# Patient Record
(demographics unavailable — no encounter records)

---

## 2024-12-04 NOTE — REVIEW OF SYSTEMS
[Swelling] : swelling [All other systems negative] : All other systems negative [de-identified] : pedal [Negative] : Heme/Lymph

## 2024-12-04 NOTE — REVIEW OF SYSTEMS
[Swelling] : swelling [All other systems negative] : All other systems negative [de-identified] : pedal [Negative] : Heme/Lymph

## 2024-12-04 NOTE — REVIEW OF SYSTEMS
[Swelling] : swelling [All other systems negative] : All other systems negative [de-identified] : pedal [Negative] : Heme/Lymph

## 2024-12-05 NOTE — HISTORY OF PRESENT ILLNESS
[FreeTextEntry1] : NILESH VANG is a 74y Male Cantonese speaking with PMHx of prostate CA  s/p radiation, elevated BP w/o hx HTN, HLD, DM, CKD stage 2, anemia presents to [facility] ED for dizziness. LKW 11 night before bed. Woke up  AM with room spinning dizziness and feeling weak in his legs and off balance while walking. Otherwise denies focal deficits. Reports feels better now however dizziness still persists. CTH neg. CTA with deposition of calcified plaque with mild to moderate stenosis in association with the cavernous and supraclinoid right internal carotid artery and mild stenosis involving the cavernous and supraclinoid left internal carotid artery. Has also been c/o non-traumatic right hip pain. CT pelvis neg for fracture. Was given DAPT and transferred to [facility] stroke service for further w/u. Endocrine consulted in hospital for diabetes management.  Suspect patient is insulinopenic as basal/bolus ratio disproportionately favors bolus to basal. C-peptide 0.1 with FS 89 - indeterminate due to FS <100mg/dL. He is also thin. Limits carbs in diet at home, due to fear of PP hyperglycemia. Family reports that lantus insulin is leaking at home. On assessment, noted that he was not removing second needle cap, so not injecting insulin.  Pre hospital diabetes history: - Age at diagnosis: 55;  - Symptoms at time of diagnosis: Pt recalls feeling thirsty at diagnosis.  - Current Therapy: Farxiga 10 mg, Lantus 40 units, Metformin 850 mg, Januvia 100 mg, Actos 45 mg  - History of other regimens: Pt recalls two medications being stopped 3 months ago but cannot recall which ones, and was also started on Lantus - History of hypoglycemia: none - History of DKA/HHS: none - Complications: none - Home FS-200       > Fastin-200 mg/dL.       > Before meals: 180-200 mg/dL.      - Diet:         > Breakfast: oatmeal with egg        > Lunch: Chinese vegetables, soup, chicken       > Dinner: soup, vegetables sweet potatoes       > Snacks: almonds, cashews, peanuts       > Drinks: water, herb tea, black coffee  - Physical activity: Park activities 2x daily  - Outpatient follow-up: Dr. Dominic Mccormack, PCP   - Discharge recommendation:  - Stop home Metformin, Actos, Januvia, and Farxiga  - Lantus 22 units in the AM. Lispro 9 units before breakfast and 7 units before lunch and dinner + 2-3 units of lispro to his premeal doses for fingersticks over 250 - Cannot do CGM through [employer] due to medicare part B, but daughter purchased brinda 2+ with coupon and was placed prior to discharge. - Free style brinda 3 sent to [facility] ([phone]) as per daughter's wishes so they can process through Medicare Part B.   2024: Televideo with daughter and patient to for HFU insulin adjustment. Pt sitting in wheelchair, alert, no distress. Feeling well. Complaining of new foot swelling and concerned it's related to insulin. Denies pain, numbness or tingling. Advised leg elevation and to discuss with PCP. Likely unrelated to insulin or glucose control. Daughter reports that he is not eating consistent carbs. Eats few bites of carbs at meals, then glucose drops a few hours later and he overeats in response, and glucose goes up to 200-300s. Reports overnight glucose around 2-3am in 70s, for which he also eats. Fasting glucose reportedly 130-140. Is taking lantus 22 units in the AM, and Lispro 9 units before breakfast and 7 units before lunch and dinner, as recommended. Using freestyle brinda. Plan: Decrease Lantus to 16 units (from 22 units) in the AM. Increase lispro to 12 units for each meal and eat 45-60gm carb at each meal.  2024: Televideo with daughter and patient to for HFU insulin adjustment. She made insulin adjustment as recommended above, but was he was experiencing episodes of PP hypoglycemia (unclear if his meal carb was appropriate) and some hyperglycemia overnight. She visited PCP a few days ago and they adjusted to Lantus 18 and lispro 8 if BG <200 and 10 if >200, which she thinks is working better. No further hypoglycemia. Last night  (that reflects lantus 18) This morning, . Took Lantus 16 (pt forgot dose was increased) and lispro 8. Ate oatmeal and 1 slice of bread. 1hr . Daughter gave an additional 2 units and thought the hyperglycemia from only getting 16 of lantus. He is persistently hungry. Snacks on fruit, broth, nuts, and almond milk. Per daughter he is eating more consistent carbs at meals.

## 2024-12-05 NOTE — ASSESSMENT
[FreeTextEntry1] : Daryl Parra is a 74-year-old Male Cantonese speaking with PMHx of prostate CA 2023 s/p radiation, elevated BP w/o hx HTN, HLD, DM, CKD stage 2, anemia presented to [facility] ED for dizziness. CTH neg. CTA with deposition of calcified plaque with mild to moderate stenosis in association with the cavernous and supraclinoid right internal carotid artery and mild stenosis involving the cavernous and supraclinoid left internal carotid artery. Was given DAPT and transferred to [facility] stroke service for further w/u. MRI negative for acute infarction however does have posterior temporal horn prominence, cannot rule out underlying NPH. PT given meclizine in the ED for presumed peripheral vertigo with improvement of symptoms. He now presents to the office for post hospitalization follow-up.  DIzziness improved with Meclizine, will refer to ENT and also vestibular therapy for further treatment of peripheral vertigo. Advised to keep taking ASA for ICAD, LDL at goal. Will need neurosurgery evaluation for the abnormal MRI.   Plan: -Refer to neurosurgery for abnormal MRI  -Continue Meclizine PRN for vertigo -Referred to vestibular therapy -Referred to ENT for vertigo symptoms, negative stroke work-up -Advised to continue ASA and Lipitor for ICAD -Continue to f/u with Endocrinology for DM II management (A1c 11%) -RTC in 3 months

## 2024-12-05 NOTE — HISTORY OF PRESENT ILLNESS
[FreeTextEntry1] : Daryl Parra is a 74-year-old Male Cantonese speaking with PMHx of prostate CA 2023 s/p radiation, elevated BP w/o hx HTN, HLD, DM, CKD stage 2, anemia presented to [facility] ED for dizziness. CTH neg. CTA with deposition of calcified plaque with mild to moderate stenosis in association with the cavernous and supraclinoid right internal carotid artery and mild stenosis involving the cavernous and supraclinoid left internal carotid artery. Was given DAPT and transferred to [facility] stroke service for further w/u. MRI negative for acute infarction however does have posterior temporal horn prominence, cannot rule out underlying NPH. PT given meclizine in the ED for presumed peripheral vertigo with improvement of symptoms. He now presents to the office for post hospitalization follow-up.  Hospital course: During this hospital course, patient did not have a stroke.  Imaging: CT Head: No acute intracranial hemorrhage, mass effect, or midline shift MR Head Non Contrast: No evidence of acute infarction. Prominence of the temporal horns; findings may be due to volume loss though early hydrocephalus cannot be excluded CT Angio Head:  Deposition of calcified plaque with mild to moderate stenosis in association with the cavernous and supraclinoid right internal carotid artery and mild stenosis involving the cavernous and supraclinoid left internal carotid artery CT Angio Neck: negative [x]echo:  1. Normal left ventricular size and systolic function.  2. Normal right ventricular size and systolic function.  3. Normal atria.  4. Injection of agitated saline via a peripheral vein reveals no evidence of a right-to-left shunt.  5. Trace aortic regurgitation.  6. Aortic sclerosis without significant stenosis.  7. No evidence of pulmonary hypertension.  8. No pericardial effusion. [x] NICHOLAS:   1. Normal left ventricular size and systolic function.  2. Normal right ventricular size and systolic function.  3. No LA/RA/CANDACE/RAA thrombus seen.  4. No evidence of an intracardiac shunt.  5. 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  6. A filamentous echodensity is noted at the tip of the aortic valve, measuring 0.4 cm, suggestive of a fibrin strand (Lambl's excrescence).  7. No significant valvular disease.  8. No evidence of pulmonary hypertension.  9. No pericardial effusion.  Hospital labs: A1C: 11.1%, LDL: 48, TSH: 0.507  Current meds:  ASA, Lipitor 10mg, Ferrous sulfate 325mg, Flomax 0.4mg, Lantus 22units, Magnesium 400mg, Vit D 50,000, Meclizine 12.5mg, Novolog 7units BID before lunch and dinner, 9 units before breakfast, Prilosec 40mg daily, Vascepa 1Gm,   Since discharge, patient continues to have intermittent dizziness but not as bad as when he was in the hospital. He denies nausea, vomiting, headaches or room spinning sensation. Dizziness worsens when he is up and around, relieved when he lays down and sleep. He was compliant with the Meclizine x 1 week as prescribed. Since then, he had taken 4pills of PRN Meclizine this week for slight dizziness. Symptoms are relieved by Meclizine, He has not started vestibular therapy as recommended. Daughter states they never received a referral. He has never seen ENT in the past. They deny recent viral illness, but states last two days he has a cough. He has seen his endocrinologist recently and was educated on correct use of his antidiabetic medications. He mostly eats healthy.   Daughter reports that she noticed since May after his prostate cancer treatment with radiation, his gait has changed. He presented to the office today walking with a cane, he started using after discharge from the hospital because he was complaining he is dizzy. He has not had any falls. He also reports he has some sensation that the food doesn't come down easily when he eats but denies choking episodes.

## 2024-12-05 NOTE — ASSESSMENT
[Carbohydrate Consistent Diet] : carbohydrate consistent diet [Importance of Diet and Exercise] : importance of diet and exercise to improve glycemic control, achieve weight loss and improve cardiovascular health [Self Monitoring of Blood Glucose] : self monitoring of blood glucose [FreeTextEntry1] : The Lantus dose seems appropriate and advised her not to adjust the lantus further based on hyperglycemia during day. If he's hyperglycemic during day, it's likely an issue with lispro dose or mismatch of food and insulin. Advised moving fruit to mealtime when there is insulin to control the glucose, and he might need a few more units this way. Advised low carb snacks in between meals as there is no insulin in between meals. Reviewed blood glucose targets. Will send brinda sensor to retail, as she might pay OOP until endocrine visit can submit thru DME.  Can continue lantus 18 units in AM and lispro 8-10 units before meals.

## 2024-12-05 NOTE — ASSESSMENT
[FreeTextEntry1] : Daryl Parra is a 74-year-old Male Cantonese speaking with PMHx of prostate CA 2023 s/p radiation, elevated BP w/o hx HTN, HLD, DM, CKD stage 2, anemia presented to [facility] ED for dizziness. CTH neg. CTA with deposition of calcified plaque with mild to moderate stenosis in association with the cavernous and supraclinoid right internal carotid artery and mild stenosis involving the cavernous and supraclinoid left internal carotid artery. Was given DAPT and transferred to [facility] stroke service for further w/u. MRI negative for acute infarction however does have posterior temporal horn prominence, cannot rule out underlying NPH. PT given meclizine in the ED for presumed peripheral vertigo with improvement of symptoms. He now presents to the office for post hospitalization follow-up.  DIzziness improved with Meclizine, will refer to ENT and also vestibular therapy for further treatment of peripheral vertigo. Advised to keep taking ASA for ICAD, LDL at goal. Will need neurosurgery evaluation for the abnormal MRI.   Plan: -Refer to neurosurgery for abnormal MRI  -Continue Meclizine PRN for vertigo -Referred to vestibular therapy -Referred to ENT for vertigo symptoms, negative stroke work-up -Advised to continue ASA and Lipitor for ICAD -Continue to f/u with Endocrinology for DM II management (A1c 11%) -RTC in 3 months

## 2024-12-05 NOTE — PHYSICAL EXAM
[FreeTextEntry1] : Alert. Fully oriented. Speech and language are intact. Cranial nerves II-XII are intact. Motor exam reveals intact strength with individual muscle testing in bilateral upper and lower extremities. Tone is normal. Reflexes are normal. Sensation is intact to light touch in distal extremities. Finger-to-nose and heel-to-shin are intact. Rapid alternating movements are normal in the upper and lower extremities. Broad based gait without use of cane.

## 2024-12-05 NOTE — HISTORY OF PRESENT ILLNESS
[FreeTextEntry1] : Daryl Parra is a 74-year-old Male Cantonese speaking with PMHx of prostate CA 2023 s/p radiation, elevated BP w/o hx HTN, HLD, DM, CKD stage 2, anemia presented to [facility] ED for dizziness. CTH neg. CTA with deposition of calcified plaque with mild to moderate stenosis in association with the cavernous and supraclinoid right internal carotid artery and mild stenosis involving the cavernous and supraclinoid left internal carotid artery. Was given DAPT and transferred to [facility] stroke service for further w/u. MRI negative for acute infarction however does have posterior temporal horn prominence, cannot rule out underlying NPH. PT given meclizine in the ED for presumed peripheral vertigo with improvement of symptoms. He now presents to the office for post hospitalization follow-up.  Hospital course: During this hospital course, patient did not have a stroke.  Imaging: CT Head: No acute intracranial hemorrhage, mass effect, or midline shift MR Head Non Contrast: No evidence of acute infarction. Prominence of the temporal horns; findings may be due to volume loss though early hydrocephalus cannot be excluded CT Angio Head:  Deposition of calcified plaque with mild to moderate stenosis in association with the cavernous and supraclinoid right internal carotid artery and mild stenosis involving the cavernous and supraclinoid left internal carotid artery CT Angio Neck: negative [x]echo:  1. Normal left ventricular size and systolic function.  2. Normal right ventricular size and systolic function.  3. Normal atria.  4. Injection of agitated saline via a peripheral vein reveals no evidence of a right-to-left shunt.  5. Trace aortic regurgitation.  6. Aortic sclerosis without significant stenosis.  7. No evidence of pulmonary hypertension.  8. No pericardial effusion. [x] NICHOLAS:   1. Normal left ventricular size and systolic function.  2. Normal right ventricular size and systolic function.  3. No LA/RA/CANDACE/RAA thrombus seen.  4. No evidence of an intracardiac shunt.  5. 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  6. A filamentous echodensity is noted at the tip of the aortic valve, measuring 0.4 cm, suggestive of a fibrin strand (Lambl's excrescence).  7. No significant valvular disease.  8. No evidence of pulmonary hypertension.  9. No pericardial effusion.  Hospital labs: A1C: 11.1%, LDL: 48, TSH: 0.507  Current meds:  ASA, Lipitor 10mg, Ferrous sulfate 325mg, Flomax 0.4mg, Lantus 22units, Magnesium 400mg, Vit D 50,000, Meclizine 12.5mg, Novolog 7units BID before lunch and dinner, 9 units before breakfast, Prilosec 40mg daily, Vascepa 1Gm,   Since discharge, patient continues to have intermittent dizziness but not as bad as when he was in the hospital. He denies nausea, vomiting, headaches or room spinning sensation. Dizziness worsens when he is up and around, relieved when he lays down and sleep. He was compliant with the Meclizine x 1 week as prescribed. Since then, he had taken 4pills of PRN Meclizine this week for slight dizziness. Symptoms are relieved by Meclizine, He has not started vestibular therapy as recommended. Daughter states they never received a referral. He has never seen ENT in the past. They deny recent viral illness, but states last two days he has a cough. He has seen his endocrinologist recently and was educated on correct use of his antidiabetic medications. He mostly eats healthy.   Daughter reports that she noticed since May after his prostate cancer treatment with radiation, his gait has changed. He presented to the office today walking with a cane, he started using after discharge from the hospital because he was complaining he is dizzy. He has not had any falls. He also reports he has some sensation that the food doesn't come down easily when he eats but denies choking episodes.

## 2024-12-05 NOTE — HISTORY OF PRESENT ILLNESS
[FreeTextEntry1] : NILESH VANG is a 74y Male Cantonese speaking with PMHx of prostate CA  s/p radiation, elevated BP w/o hx HTN, HLD, DM, CKD stage 2, anemia presents to [facility] ED for dizziness. LKW 11 night before bed. Woke up  AM with room spinning dizziness and feeling weak in his legs and off balance while walking. Otherwise denies focal deficits. Reports feels better now however dizziness still persists. CTH neg. CTA with deposition of calcified plaque with mild to moderate stenosis in association with the cavernous and supraclinoid right internal carotid artery and mild stenosis involving the cavernous and supraclinoid left internal carotid artery. Has also been c/o non-traumatic right hip pain. CT pelvis neg for fracture. Was given DAPT and transferred to [facility] stroke service for further w/u. Endocrine consulted in hospital for diabetes management.  Suspect patient is insulinopenic as basal/bolus ratio disproportionately favors bolus to basal. C-peptide 0.1 with FS 89 - indeterminate due to FS <100mg/dL. He is also thin. Limits carbs in diet at home, due to fear of PP hyperglycemia. Family reports that lantus insulin is leaking at home. On assessment, noted that he was not removing second needle cap, so not injecting insulin.  Pre hospital diabetes history: - Age at diagnosis: 55;  - Symptoms at time of diagnosis: Pt recalls feeling thirsty at diagnosis.  - Current Therapy: Farxiga 10 mg, Lantus 40 units, Metformin 850 mg, Januvia 100 mg, Actos 45 mg  - History of other regimens: Pt recalls two medications being stopped 3 months ago but cannot recall which ones, and was also started on Lantus - History of hypoglycemia: none - History of DKA/HHS: none - Complications: none - Home FS-200       > Fastin-200 mg/dL.       > Before meals: 180-200 mg/dL.      - Diet:         > Breakfast: oatmeal with egg        > Lunch: Chinese vegetables, soup, chicken       > Dinner: soup, vegetables sweet potatoes       > Snacks: almonds, cashews, peanuts       > Drinks: water, herb tea, black coffee  - Physical activity: Park activities 2x daily  - Outpatient follow-up: Dr. Dominic Mccormack, PCP   - Discharge recommendation:  - Stop home Metformin, Actos, Januvia, and Farxiga  - Lantus 22 units in the AM. Lispro 9 units before breakfast and 7 units before lunch and dinner + 2-3 units of lispro to his premeal doses for fingersticks over 250 - Cannot do CGM through [employer] due to medicare part B, but daughter purchased brinda 2+ with coupon and was placed prior to discharge. - Free style brinda 3 sent to [facility] ([phone]) as per daughter's wishes so they can process through Medicare Part B.   2024: Televideo with daughter and patient to for HFU insulin adjustment. Pt sitting in wheelchair, alert, no distress. Feeling well. Complaining of new foot swelling and concerned it's related to insulin. Denies pain, numbness or tingling. Advised leg elevation and to discuss with PCP. Likely unrelated to insulin or glucose control. Daughter reports that he is not eating consistent carbs. Eats few bites of carbs at meals, then glucose drops a few hours later and he overeats in response, and glucose goes up to 200-300s. Reports overnight glucose around 2-3am in 70s, for which he also eats. Fasting glucose reportedly 130-140. Is taking lantus 22 units in the AM, and Lispro 9 units before breakfast and 7 units before lunch and dinner, as recommended. Using freestyle brinda. Plan: Decrease Lantus to 16 units (from 22 units) in the AM. Increase lispro to 12 units for each meal and eat 45-60gm carb at each meal.  2024: Televideo with daughter and patient to for HFU insulin adjustment. She made insulin adjustment as recommended above, but was he was experiencing episodes of PP hypoglycemia (unclear if his meal carb was appropriate) and some hyperglycemia overnight. She visited PCP a few days ago and they adjusted to Lantus 18 and lispro 8 if BG <200 and 10 if >200, which she thinks is working better. No further hypoglycemia. Last night  (that reflects lantus 18) This morning, . Took Lantus 16 (pt forgot dose was increased) and lispro 8. Ate oatmeal and 1 slice of bread. 1hr . Daughter gave an additional 2 units and thought the hyperglycemia from only getting 16 of lantus. He is persistently hungry. Snacks on fruit, broth, nuts, and almond milk. Per daughter he is eating more consistent carbs at meals.

## 2024-12-05 NOTE — ASSESSMENT
[FreeTextEntry1] : Daryl Parra is a 74-year-old Male Cantonese speaking with PMHx of prostate CA 2023 s/p radiation, elevated BP w/o hx HTN, HLD, DM, CKD stage 2, anemia presented to [facility] ED for dizziness. CTH neg. CTA with deposition of calcified plaque with mild to moderate stenosis in association with the cavernous and supraclinoid right internal carotid artery and mild stenosis involving the cavernous and supraclinoid left internal carotid artery. Was given DAPT and transferred to [facility] stroke service for further w/u. MRI negative for acute infarction however does have posterior temporal horn prominence, cannot rule out underlying NPH. PT given meclizine in the ED for presumed peripheral vertigo with improvement of symptoms. He now presents to the office for post hospitalization follow-up.  DIzziness improved with Meclizine, will refer to ENT and also vestibular therapy for further treatment of peripheral vertigo. Advised to keep taking ASA for ICAD, LDL at goal. Will need neurosurgery evaluation for the abnormal MRI.   Plan: -Refer to neurosurgery for abnormal MRI  -Continue Meclizine PRN for vertigo -Referred to vestibular therapy -Referred to ENT for vertigo symptoms, negative stroke work-up -Advised to continue ASA and Lipitor for ICAD -Continue to f/u with Endocrinology for DM II management (A1c 11%) -RTC in 3 months

## 2024-12-05 NOTE — HISTORY OF PRESENT ILLNESS
[FreeTextEntry1] : NILESH VANG is a 74y Male Cantonese speaking with PMHx of prostate CA  s/p radiation, elevated BP w/o hx HTN, HLD, DM, CKD stage 2, anemia presents to [facility] ED for dizziness. LKW 11 night before bed. Woke up  AM with room spinning dizziness and feeling weak in his legs and off balance while walking. Otherwise denies focal deficits. Reports feels better now however dizziness still persists. CTH neg. CTA with deposition of calcified plaque with mild to moderate stenosis in association with the cavernous and supraclinoid right internal carotid artery and mild stenosis involving the cavernous and supraclinoid left internal carotid artery. Has also been c/o non-traumatic right hip pain. CT pelvis neg for fracture. Was given DAPT and transferred to [facility] stroke service for further w/u. Endocrine consulted in hospital for diabetes management. Suspect patient is insulinopenic as basal/bolus ratio disproportionately favors bolus to basal. C-peptide 0.1 with FS 89 - indeterminate due to FS <100mg/dL. He is also thin. Limits carbs in diet at home, due to fear of PP hyperglycemia. Family reports that lantus insulin is leaking at home. On assessment, noted that he was not removing second needle cap, so not injecting insulin.  Pre hospital diabetes history: - Age at diagnosis: 55;  - Symptoms at time of diagnosis: Pt recalls feeling thirsty at diagnosis. - Current Therapy: Farxiga 10 mg, Lantus 40 units, Metformin 850 mg, Januvia 100 mg, Actos 45 mg - History of other regimens: Pt recalls two medications being stopped 3 months ago but cannot recall which ones, and was also started on Lantus - History of hypoglycemia: none - History of DKA/HHS: none - Complications: none - Home FS-200  > Fastin-200 mg/dL.  > Before meals: 180-200 mg/dL.  - Diet:  > Breakfast: oatmeal with egg  > Lunch: Chinese vegetables, soup, chicken  > Dinner: soup, vegetables sweet potatoes  > Snacks: almonds, cashews, peanuts  > Drinks: water, herb tea, black coffee - Physical activity: Park activities 2x daily - Outpatient follow-up: Dr. Dominic Mccormack, PCP  - Discharge recommendation: - Stop home Metformin, Actos, Januvia, and Farxiga - Lantus 22 units in the AM. Lispro 9 units before breakfast and 7 units before lunch and dinner + 2-3 units of lispro to his premeal doses for fingersticks over 250 - Cannot do CGM through [employer] due to medicare part B, but daughter purchased brinda 2+ with coupon and was placed prior to discharge. - [facility] style [facility]inda 3 [email or web address][phone][facility] as per daughter's wishes so they can process through Medicare Part B.  2024: Televideo with daughter and patient to for HFU insulin adjustment. Pt sitting in wheelchair, alert, no distress. Feeling well. Complaining of new foot swelling and concerned it's related to insulin. Denies pain, numbness or tingling. Advised leg elevation and to discuss with PCP. Likely unrelated to insulin or glucose control. Daughter reports that he is not eating consistent carbs. Eats few bites of carbs at meals, then glucose drops a few hours later and he overeats in response, and glucose goes up to 200-300s. Reports overnight glucose around 2-3am in 70s, for which he also eats. Fasting glucose reportedly 130-140. Is taking lantus 22 units in the AM, and Lispro 9 units before breakfast and 7 units before lunch and dinner, as recommended. Using freestyle brinda. Plan: Decrease Lantus to 16 units (from 22 units) in the AM. Increase lispro to 12 units for each meal and eat 45-60gm carb at each meal.  2024: Televideo with daughter and patient to for HFU insulin adjustment. She made insulin adjustment as recommended above, but was he was experiencing episodes of PP hypoglycemia (unclear if his meal carb was appropriate) and some hyperglycemia overnight. She visited PCP a few days ago and they adjusted to Lantus 18 and lispro 8 if BG <200 and 10 if >200, which she thinks is working better. No further hypoglycemia. Last night  (that reflects lantus 18) This morning, . Took Lantus 16 (pt forgot dose was increased) and lispro 8. Ate oatmeal and 1 slice of bread. 1hr . Daughter gave an additional 2 units and thought the hyperglycemia from only getting 16 of lantus. He is persistently hungry. Snacks on fruit, broth, nuts, and almond milk. Per daughter he is eating more consistent carbs at meals.

## 2024-12-05 NOTE — HISTORY OF PRESENT ILLNESS
[FreeTextEntry1] : Daryl Parra is a 74-year-old Male Cantonese speaking with PMHx of prostate CA 2023 s/p radiation, elevated BP w/o hx HTN, HLD, DM, CKD stage 2, anemia presented to [facility] ED for dizziness. CTH neg. CTA with deposition of calcified plaque with mild to moderate stenosis in association with the cavernous and supraclinoid right internal carotid artery and mild stenosis involving the cavernous and supraclinoid left internal carotid artery. Was given DAPT and transferred to [facility] stroke service for further w/u. MRI negative for acute infarction however does have posterior temporal horn prominence, cannot rule out underlying NPH. PT given meclizine in the ED for presumed peripheral vertigo with improvement of symptoms. He now presents to the office for post hospitalization follow-up.  Hospital course: During this hospital course, patient did not have a stroke.  Imaging: CT Head: No acute intracranial hemorrhage, mass effect, or midline shift MR Head Non Contrast: No evidence of acute infarction. Prominence of the temporal horns; findings may be due to volume loss though early hydrocephalus cannot be excluded CT Angio Head:  Deposition of calcified plaque with mild to moderate stenosis in association with the cavernous and supraclinoid right internal carotid artery and mild stenosis involving the cavernous and supraclinoid left internal carotid artery CT Angio Neck: negative [x]echo:  1. Normal left ventricular size and systolic function.  2. Normal right ventricular size and systolic function.  3. Normal atria.  4. Injection of agitated saline via a peripheral vein reveals no evidence of a right-to-left shunt.  5. Trace aortic regurgitation.  6. Aortic sclerosis without significant stenosis.  7. No evidence of pulmonary hypertension.  8. No pericardial effusion. [x] NICHOLAS:   1. Normal left ventricular size and systolic function.  2. Normal right ventricular size and systolic function.  3. No LA/RA/CANDACE/RAA thrombus seen.  4. No evidence of an intracardiac shunt.  5. 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  6. A filamentous echodensity is noted at the tip of the aortic valve, measuring 0.4 cm, suggestive of a fibrin strand (Lambl's excrescence).  7. No significant valvular disease.  8. No evidence of pulmonary hypertension.  9. No pericardial effusion.  Hospital labs: A1C: 11.1%, LDL: 48, TSH: 0.507  Current meds:  ASA, Lipitor 10mg, Ferrous sulfate 325mg, Flomax 0.4mg, Lantus 22units, Magnesium 400mg, Vit D 50,000, Meclizine 12.5mg, Novolog 7units BID before lunch and dinner, 9 units before breakfast, Prilosec 40mg daily, Vascepa 1Gm,   Since discharge, patient continues to have intermittent dizziness but not as bad as when he was in the hospital. He denies nausea, vomiting, headaches or room spinning sensation. Dizziness worsens when he is up and around, relieved when he lays down and sleep. He was compliant with the Meclizine x 1 week as prescribed. Since then, he had taken 4pills of PRN Meclizine this week for slight dizziness. Symptoms are relieved by Meclizine, He has not started vestibular therapy as recommended. Daughter states they never received a referral. He has never seen ENT in the past. They deny recent viral illness, but states last two days he has a cough. He has seen his endocrinologist recently and was educated on correct use of his antidiabetic medications. He mostly eats healthy.   Daughter reports that she noticed since May after his prostate cancer treatment with radiation, his gait has changed. He presented to the office today walking with a cane, he started using after discharge from the hospital because he was complaining he is dizzy. He has not had any falls. He also reports he has some sensation that the food doesn't come down easily when he eats but denies choking episodes.

## 2024-12-05 NOTE — ASSESSMENT
[FreeTextEntry1] : The Lantus dose seems appropriate and advised her not to adjust the lantus further based on hyperglycemia during day. If he's hyperglycemic during day, it's likely an issue with lispro dose or mismatch of food and insulin. Advised moving fruit to mealtime when there is insulin to control the glucose, and he might need a few more units this way. Advised low carb snacks in between meals as there is no insulin in between meals. Reviewed blood glucose targets. Will send brinda sensor to retail, as she might pay OOP until endocrine visit can submit thru DME.  Can continue lantus 18 units in AM and lispro 8-10 units before meals.

## 2024-12-05 NOTE — HISTORY OF PRESENT ILLNESS
[FreeTextEntry1] : NILESH VANG is a 74y Male Cantonese speaking with PMHx of prostate CA  s/p radiation, elevated BP w/o hx HTN, HLD, DM, CKD stage 2, anemia presents to [facility] ED for dizziness. LKW 11 night before bed. Woke up  AM with room spinning dizziness and feeling weak in his legs and off balance while walking. Otherwise denies focal deficits. Reports feels better now however dizziness still persists. CTH neg. CTA with deposition of calcified plaque with mild to moderate stenosis in association with the cavernous and supraclinoid right internal carotid artery and mild stenosis involving the cavernous and supraclinoid left internal carotid artery. Has also been c/o non-traumatic right hip pain. CT pelvis neg for fracture. Was given DAPT and transferred to [facility] stroke service for further w/u. Endocrine consulted in hospital for diabetes management.  Suspect patient is insulinopenic as basal/bolus ratio disproportionately favors bolus to basal. C-peptide 0.1 with FS 89 - indeterminate due to FS <100mg/dL. He is also thin. Limits carbs in diet at home, due to fear of PP hyperglycemia. Family reports that lantus insulin is leaking at home. On assessment, noted that he was not removing second needle cap, so not injecting insulin.  Pre hospital diabetes history: - Age at diagnosis: 55;  - Symptoms at time of diagnosis: Pt recalls feeling thirsty at diagnosis.  - Current Therapy: Farxiga 10 mg, Lantus 40 units, Metformin 850 mg, Januvia 100 mg, Actos 45 mg  - History of other regimens: Pt recalls two medications being stopped 3 months ago but cannot recall which ones, and was also started on Lantus - History of hypoglycemia: none - History of DKA/HHS: none - Complications: none - Home FS-200       > Fastin-200 mg/dL.       > Before meals: 180-200 mg/dL.      - Diet:         > Breakfast: oatmeal with egg        > Lunch: Chinese vegetables, soup, chicken       > Dinner: soup, vegetables sweet potatoes       > Snacks: almonds, cashews, peanuts       > Drinks: water, herb tea, black coffee  - Physical activity: Park activities 2x daily  - Outpatient follow-up: Dr. Dominic Mccormack, PCP   - Discharge recommendation:  - Stop home Metformin, Actos, Januvia, and Farxiga  - Lantus 22 units in the AM. Lispro 9 units before breakfast and 7 units before lunch and dinner + 2-3 units of lispro to his premeal doses for fingersticks over 250 - Cannot do CGM through [facility] due to medicare part B, but daughter purchased brinda 2+ with coupon and was placed prior to discharge. - [facility] style brinda 3 [facility] ([phone]) as per daughter's wishes so they can process through Medicare Part B.   2024: Televideo with daughter and patient to for HFU insulin adjustment. Pt sitting in wheelchair, alert, no distress. Feeling well. Complaining of new foot swelling and concerned it's related to insulin. Denies pain, numbness or tingling. Advised leg elevation and to discuss with PCP. Likely unrelated to insulin or glucose control. Daughter reports that he is not eating consistent carbs. Eats few bites of carbs at meals, then glucose drops a few hours later and he overeats in response, and glucose goes up to 200-300s. Reports overnight glucose around 2-3am in 70s, for which he also eats. Fasting glucose reportedly 130-140. Is taking lantus 22 units in the AM, and Lispro 9 units before breakfast and 7 units before lunch and dinner, as recommended. Using freestyle brinda. Plan: Decrease Lantus to 16 units (from 22 units) in the AM. Increase lispro to 12 units for each meal and eat 45-60gm carb at each meal.  2024: Televideo with daughter and patient to for HFU insulin adjustment. She made insulin adjustment as recommended above, but was he was experiencing episodes of PP hypoglycemia (unclear if his meal carb was appropriate) and some hyperglycemia overnight. She visited PCP a few days ago and they adjusted to Lantus 18 and lispro 8 if BG <200 and 10 if >200, which she thinks is working better. No further hypoglycemia. Last night  (that reflects lantus 18) This morning, . Took Lantus 16 (pt forgot dose was increased) and lispro 8. Ate oatmeal and 1 slice of bread. 1hr . Daughter gave an additional 2 units and thought the hyperglycemia from only getting 16 of lantus. He is persistently hungry. Snacks on fruit, broth, nuts, and almond milk. Per daughter he is eating more consistent carbs at meals.

## 2024-12-09 NOTE — DATA REVIEWED
[de-identified] : Recent MRI results reviewed [de-identified] : Hospital discharge summary reviewed.

## 2024-12-09 NOTE — HISTORY OF PRESENT ILLNESS
[de-identified] : NILESH VANG has a history of ED care and admitted on November 19 - 23, 2024 for worsening dizziness.  Stroke and other work up was completed. The patient is accompanied by his daughter who provides his history. Dizziness without spinning reported that is reported to be possibly positionally induced. No associated nausea or auditory changes. No tinnitus.  No head injury.  No prior ear disease.

## 2024-12-09 NOTE — DATA REVIEWED
[de-identified] : Recent MRI results reviewed [de-identified] : Hospital discharge summary reviewed.

## 2024-12-09 NOTE — HISTORY OF PRESENT ILLNESS
[de-identified] : NILESH VANG has a history of ED care and admitted on November 19 - 23, 2024 for worsening dizziness.  Stroke and other work up was completed. The patient is accompanied by his daughter who provides his history. Dizziness without spinning reported that is reported to be possibly positionally induced. No associated nausea or auditory changes. No tinnitus.  No head injury.  No prior ear disease.

## 2024-12-09 NOTE — PHYSICAL EXAM
[FreeTextEntry1] : Procedure: Microscopic Ear Exam  Left ear:  Ear canal intact without inflammation or lesion.   Tympanic membrane intact without inflammation.  Right ear:  Ear canal intact without inflammation or lesion.   Tympanic membrane intact without inflammation.  [place] Hallpike Testing was performed:  induced nystagmus and mil vertigo occured in the head-left positioning.   [Midline] : trachea located in midline position [] : [place]-Hallpike test is positive [Normal] : no rashes

## 2024-12-09 NOTE — PHYSICAL EXAM
[FreeTextEntry1] : Procedure: Microscopic Ear Exam  Left ear:  Ear canal intact without inflammation or lesion.   Tympanic membrane intact without inflammation.  Right ear:  Ear canal intact without inflammation or lesion.   Tympanic membrane intact without inflammation.  [place] Hallpike Testing was performed:  induced nystagmus and mil vertigo occured in the head-left positioning.   [Midline] : trachea located in midline position [] : [place]-Hallpike test is positive [Normal] : no rashes

## 2024-12-09 NOTE — ASSESSMENT
[FreeTextEntry1] : Episodic dizziness which is nonspecific.  However today's evaluation included position testing which suggest left positional vertigo.  I reviewed this with the patient and his daughter.  I have referred him for vestibular physical therapy.  Other etiologies may be contributing.  He is scheduled to continue his evaluation With other specialists. Significant hearing loss is present bilaterally.  Amplification recommended.  Time based billing: This encounter required more than 45 minutes of time excluding procedures.

## 2024-12-11 NOTE — DATA REVIEWED
[de-identified] :   ACC: [id number] EXAM: MR BRAIN ORDERED BY: ANGELA REYNA  PROCEDURE DATE: 11/20/2024    INTERPRETATION: Stroke workup.  TECHNIQUE: MRI of the brain was performed utilizing stroke protocol. Axial flair and diffusion-weighted imaging were acquired.  FINDINGS:There are a few scattered punctate foci of T2 and Flair signal hyperintensities within the white matter that are nonspecific. There is no evidence of mass-effect or midline shift. There is no evidence of intra or extra-axial fluid collection. There is enlargement the sulci, cisterns and ventricles consistent with mild cerebral and cerebellar volume loss. There is superimposed prominence of the lateral ventricles particularly the temporal horns. There is no evidence of acute infarction.  IMPRESSION: No evidence of acute infarction. Prominence of the temporal horns; findings may be due to volume loss though early hydrocephalus cannot be excluded.  --- End of Report ---      JAE VANG MD; Attending Radiologist This document has been electronically signed. Nov 20 2024 7:22PM [de-identified] :     ACC: [id number] EXAM: CT ANGIO BRAIN (W)AW IC ORDERED BY: MIGUELINA GOMEZ  ACC: [id number] EXAM: CT ANGIO NECK (W)AW IC ORDERED BY: MIGUELINA GOMEZ  ACC: [id number] EXAM: CT BRAIN ORDERED BY: MIGUELINA GOMEZ  PROCEDURE DATE: 11/19/2024    INTERPRETATION: CLINICAL INFORMATION: dizziness, room spinning sensation  COMPARISON: None.  CONTRAST: IV Contrast: Omnipaque 350 95 cc administered 5 cc discarded  TECHNIQUE: CT BRAIN: Serial axial images were obtained from the skull base to the vertex without the use of contrast.  CTA NECK/HEAD: After the intravenous power injection of non-ionic contrast material, serial thin sections were obtained through the cervical and intracranial circulation on a multislice CT scanner. Axial, Coronal and Sagittal MIP reformatted images were obtained. 3D reconstruction was also performed.  FINDINGS:  CT BRAIN:  VENTRICLES AND SULCI: Age appropriate involutional changes. INTRA-AXIAL: No mass effect, acute hemorrhage, or midline shift. EXTRA-AXIAL: No mass or fluid collection. Basal cisterns are normal in appearance.  VISUALIZED SINUSES: Clear. TYMPANOMASTOID CAVITIES: Clear. VISUALIZED ORBITS: Normal. CALVARIUM: Intact.  MISCELLANEOUS: None.   CTA NECK:  AORTIC ARCH AND VISUALIZED GREAT VESSELS: Within normal limits.  RIGHT: COMMON CAROTID ARTERY: No significant stenosis to the carotid bifurcation. INTERNAL CAROTID ARTERY: Deposition of calcified plaque at the bifurcation of the right internal carotid artery and proximal aspect of the right internal carotid artery, without significant stenosis based on NASCET criteria. VERTEBRAL ARTERY: Normal in course and caliber to the intracranial circulation.  LEFT: COMMON CAROTID ARTERY: No significant stenosis to the carotid bifurcation. INTERNAL CAROTID ARTERY: No significant stenosis based on NASCET criteria. VERTEBRAL ARTERY: Normal in course and caliber to the intracranial circulation.  VISUALIZED LUNGS: Clear.  MISCELLANEOUS: None.  CAROTID STENOSIS REFERENCE: Percent (%) stenosis is expressed in terms of NASCET Criteria. (NASCET = 100x1-(N/D)). N=greatest narrowing. D=normal distal diameter - MILD = <50% stenosis. - MODERATE = 50-69% stenosis. - SEVERE = 70-89% stenosis. - HAIRLINE/CRITICAL = 90-99% stenosis. - OCCLUDED = 100% stenosis.   CTA HEAD:  INTERNAL CAROTID ARTERIES: Bilateral petrous, precavernous, cavernous, and supraclinoid regions are patent. Deposition of calcified plaque with mild to moderate stenosis in association with the cavernous and supraclinoid right internal carotid artery and mild stenosis involving the cavernous and supraclinoid left internal carotid artery.  [place]: No aneurysm identified. Tiny aneurysms can be beyond the resolution of CTA technique.  ANTERIOR CEREBRAL ARTERIES: No significant stenosis or occlusion. MIDDLE CEREBRAL ARTERIES: No significant stenosis or occlusion. POSTERIOR CEREBRAL ARTERIES: No significant stenosis or occlusion.  DISTAL VERTEBRAL / BASILAR ARTERIES: No significant stenosis or occlusion.  VENOUS STRUCTURES: Visualized superficial and deep venous structures appear patent.  MISCELLANEOUS: No other vascular abnormality is seen.   IMPRESSION:  CT HEAD: No acute intracranial hemorrhage, mass effect, or midline shift. If dizziness and/or vertigo persists, consider further evaluation via MR imaging to include DWI and ADC mapping techniques, provided there are no contraindications. If patient has persistent central vertigo follow-up imaging may include dedicated pre and postcontrast MR imaging of the IAC region.  CTA NECK: No evidence of significant stenosis or occlusion.  CTA HEAD: 1. No large vessel occlusion. 2. Deposition of calcified plaque with mild to moderate stenosis in association with the cavernous and supraclinoid right internal carotid artery and mild stenosis involving the cavernous and supraclinoid left internal carotid artery..    --- End of Report ---

## 2024-12-11 NOTE — DATA REVIEWED
[de-identified] :   ACC: [id number] EXAM: MR BRAIN ORDERED BY: ANGELA REYNA  PROCEDURE DATE: 11/20/2024    INTERPRETATION: Stroke workup.  TECHNIQUE: MRI of the brain was performed utilizing stroke protocol. Axial flair and diffusion-weighted imaging were acquired.  FINDINGS:There are a few scattered punctate foci of T2 and Flair signal hyperintensities within the white matter that are nonspecific. There is no evidence of mass-effect or midline shift. There is no evidence of intra or extra-axial fluid collection. There is enlargement the sulci, cisterns and ventricles consistent with mild cerebral and cerebellar volume loss. There is superimposed prominence of the lateral ventricles particularly the temporal horns. There is no evidence of acute infarction.  IMPRESSION: No evidence of acute infarction. Prominence of the temporal horns; findings may be due to volume loss though early hydrocephalus cannot be excluded.  --- End of Report ---      JAE VANG MD; Attending Radiologist This document has been electronically signed. Nov 20 2024 7:22PM [de-identified] :     ACC: [id number] EXAM: CT ANGIO BRAIN (W)AW IC ORDERED BY: MIGUELINA GOMEZ  ACC: [id number] EXAM: CT ANGIO NECK (W)AW IC ORDERED BY: MIGUELINA GOMEZ  ACC: [id number] EXAM: CT BRAIN ORDERED BY: MIGUELINA GOMEZ  PROCEDURE DATE: 11/19/2024    INTERPRETATION: CLINICAL INFORMATION: dizziness, room spinning sensation  COMPARISON: None.  CONTRAST: IV Contrast: Omnipaque 350 95 cc administered 5 cc discarded  TECHNIQUE: CT BRAIN: Serial axial images were obtained from the skull base to the vertex without the use of contrast.  CTA NECK/HEAD: After the intravenous power injection of non-ionic contrast material, serial thin sections were obtained through the cervical and intracranial circulation on a multislice CT scanner. Axial, Coronal and Sagittal MIP reformatted images were obtained. 3D reconstruction was also performed.  FINDINGS:  CT BRAIN:  VENTRICLES AND SULCI: Age appropriate involutional changes. INTRA-AXIAL: No mass effect, acute hemorrhage, or midline shift. EXTRA-AXIAL: No mass or fluid collection. Basal cisterns are normal in appearance.  VISUALIZED SINUSES: Clear. TYMPANOMASTOID CAVITIES: Clear. VISUALIZED ORBITS: Normal. CALVARIUM: Intact.  MISCELLANEOUS: None.   CTA NECK:  AORTIC ARCH AND VISUALIZED GREAT VESSELS: Within normal limits.  RIGHT: COMMON CAROTID ARTERY: No significant stenosis to the carotid bifurcation. INTERNAL CAROTID ARTERY: Deposition of calcified plaque at the bifurcation of the right internal carotid artery and proximal aspect of the right internal carotid artery, without significant stenosis based on NASCET criteria. VERTEBRAL ARTERY: Normal in course and caliber to the intracranial circulation.  LEFT: COMMON CAROTID ARTERY: No significant stenosis to the carotid bifurcation. INTERNAL CAROTID ARTERY: No significant stenosis based on NASCET criteria. VERTEBRAL ARTERY: Normal in course and caliber to the intracranial circulation.  VISUALIZED LUNGS: Clear.  MISCELLANEOUS: None.  CAROTID STENOSIS REFERENCE: Percent (%) stenosis is expressed in terms of NASCET Criteria. (NASCET = 100x1-(N/D)). N=greatest narrowing. D=normal distal diameter - MILD = <50% stenosis. - MODERATE = 50-69% stenosis. - SEVERE = 70-89% stenosis. - HAIRLINE/CRITICAL = 90-99% stenosis. - OCCLUDED = 100% stenosis.   CTA HEAD:  INTERNAL CAROTID ARTERIES: Bilateral petrous, precavernous, cavernous, and supraclinoid regions are patent. Deposition of calcified plaque with mild to moderate stenosis in association with the cavernous and supraclinoid right internal carotid artery and mild stenosis involving the cavernous and supraclinoid left internal carotid artery.  [place]: No aneurysm identified. Tiny aneurysms can be beyond the resolution of CTA technique.  ANTERIOR CEREBRAL ARTERIES: No significant stenosis or occlusion. MIDDLE CEREBRAL ARTERIES: No significant stenosis or occlusion. POSTERIOR CEREBRAL ARTERIES: No significant stenosis or occlusion.  DISTAL VERTEBRAL / BASILAR ARTERIES: No significant stenosis or occlusion.  VENOUS STRUCTURES: Visualized superficial and deep venous structures appear patent.  MISCELLANEOUS: No other vascular abnormality is seen.   IMPRESSION:  CT HEAD: No acute intracranial hemorrhage, mass effect, or midline shift. If dizziness and/or vertigo persists, consider further evaluation via MR imaging to include DWI and ADC mapping techniques, provided there are no contraindications. If patient has persistent central vertigo follow-up imaging may include dedicated pre and postcontrast MR imaging of the IAC region.  CTA NECK: No evidence of significant stenosis or occlusion.  CTA HEAD: 1. No large vessel occlusion. 2. Deposition of calcified plaque with mild to moderate stenosis in association with the cavernous and supraclinoid right internal carotid artery and mild stenosis involving the cavernous and supraclinoid left internal carotid artery..    --- End of Report ---

## 2024-12-11 NOTE — REVIEW OF SYSTEMS
[Numbness] : numbness [As noted in HPI] : as noted in HPI [As Noted in HPI] : as noted in HPI [Fever] : no fever [Chills] : no chills [Memory Lapses or Loss] : no memory loss [Facial Weakness] : no facial weakness [Arm Weakness] : no arm weakness [Hand Weakness] : no hand weakness [Leg Weakness] : no leg weakness [Chest Pain] : no chest pain [Palpitations] : no palpitations [Shortness Of Breath] : no shortness of breath

## 2024-12-11 NOTE — ASSESSMENT
[FreeTextEntry1] : 74-year-old male with a one-month history of dizziness and balance problems. Patient describes feeling 'wobbly' when walking, with no specific directional preference. Recent negative CTH, CTA, and MRI for acute infarct, but possible early hydrocephalus noted due to volume loss. History of prostate cancer treated with radiation in 2023. Associated conditions include elevated blood pressure, high cholesterol, diabetes, CKD stage 2, and anemia. The patient may have normal pressure hydrocephalus. This is based on their reported symptoms (instability when walking, urinary accidents) and the results of an initial MRI. A more definitive assessment will be made upon the completion of a additional MRI and possibly a lumbar puncture. given urinary retention on previous CT pelvis and lumbar stennosis seen on scan. There is a plan to order a Cerebral Spinal Fluid MRI for the patient to further examine the fluid flow in their brain. A possible lumbar puncture or spinal tap may also be considered based on the MRI results. Additionally, an MRI of the patient's lower back will be performed due to patient's reported numbness in the thigh. Following these investigations, a further course of action will be planned to address the patient's condition.  Dr. D'Amico independently reviewed all available images with patient and his son.    PLAN: - MRI brain with CSF flow - MRI lumbar spine - Vestibular therapy per ENT - RTC after MRIs for review   Patient verbalizes understanding of today's discussion and next steps in treatment plan.   Today, my ACP, Lauren Arceo, was here to observe my evaluation and management services for this patient to be followed going forward.    A total of 45 minutes was spent reviewing the labs, imaging and physical examination of the patient. We discussed the diagnosis, and the plan. The patient's questions were answered. The patient demonstrated an excellent understanding of the plan.

## 2024-12-11 NOTE — REASON FOR VISIT
[New Patient Visit] : a new patient visit [Referred By: _________] : Patient was referred by REMBERTO [[employer] ] : provided by [employer]   [Family Member] : family member [FreeTextEntry1] : NPH workup [Interpreters_IDNumber] : [id number] [TWNoteComboBox1] : Miryam

## 2024-12-11 NOTE — REASON FOR VISIT
[New Patient Visit] : a new patient visit [Referred By: _________] : Patient was referred by REMBERTO [[employer] ] : provided by [employer]   [Family Member] : family member [FreeTextEntry1] : NPH workup [Interpreters_IDNumber] : [id number] [TWNoteComboBox1] : Miryam

## 2024-12-11 NOTE — DATA REVIEWED
[de-identified] :   ACC: [id number] EXAM: MR BRAIN ORDERED BY: ANGELA REYNA  PROCEDURE DATE: 11/20/2024    INTERPRETATION: Stroke workup.  TECHNIQUE: MRI of the brain was performed utilizing stroke protocol. Axial flair and diffusion-weighted imaging were acquired.  FINDINGS:There are a few scattered punctate foci of T2 and Flair signal hyperintensities within the white matter that are nonspecific. There is no evidence of mass-effect or midline shift. There is no evidence of intra or extra-axial fluid collection. There is enlargement the sulci, cisterns and ventricles consistent with mild cerebral and cerebellar volume loss. There is superimposed prominence of the lateral ventricles particularly the temporal horns. There is no evidence of acute infarction.  IMPRESSION: No evidence of acute infarction. Prominence of the temporal horns; findings may be due to volume loss though early hydrocephalus cannot be excluded.  --- End of Report ---      JAE VANG MD; Attending Radiologist This document has been electronically signed. Nov 20 2024 7:22PM [de-identified] :     ACC: [id number] EXAM: CT ANGIO BRAIN (W)AW IC ORDERED BY: MIGUELINA GOMEZ  ACC: [id number] EXAM: CT ANGIO NECK (W)AW IC ORDERED BY: MIGUELINA GOMEZ  ACC: [id number] EXAM: CT BRAIN ORDERED BY: MIGUELINA GOMEZ  PROCEDURE DATE: 11/19/2024    INTERPRETATION: CLINICAL INFORMATION: dizziness, room spinning sensation  COMPARISON: None.  CONTRAST: IV Contrast: Omnipaque 350 95 cc administered 5 cc discarded  TECHNIQUE: CT BRAIN: Serial axial images were obtained from the skull base to the vertex without the use of contrast.  CTA NECK/HEAD: After the intravenous power injection of non-ionic contrast material, serial thin sections were obtained through the cervical and intracranial circulation on a multislice CT scanner. Axial, Coronal and Sagittal MIP reformatted images were obtained. 3D reconstruction was also performed.  FINDINGS:  CT BRAIN:  VENTRICLES AND SULCI: Age appropriate involutional changes. INTRA-AXIAL: No mass effect, acute hemorrhage, or midline shift. EXTRA-AXIAL: No mass or fluid collection. Basal cisterns are normal in appearance.  VISUALIZED SINUSES: Clear. TYMPANOMASTOID CAVITIES: Clear. VISUALIZED ORBITS: Normal. CALVARIUM: Intact.  MISCELLANEOUS: None.   CTA NECK:  AORTIC ARCH AND VISUALIZED GREAT VESSELS: Within normal limits.  RIGHT: COMMON CAROTID ARTERY: No significant stenosis to the carotid bifurcation. INTERNAL CAROTID ARTERY: Deposition of calcified plaque at the bifurcation of the right internal carotid artery and proximal aspect of the right internal carotid artery, without significant stenosis based on NASCET criteria. VERTEBRAL ARTERY: Normal in course and caliber to the intracranial circulation.  LEFT: COMMON CAROTID ARTERY: No significant stenosis to the carotid bifurcation. INTERNAL CAROTID ARTERY: No significant stenosis based on NASCET criteria. VERTEBRAL ARTERY: Normal in course and caliber to the intracranial circulation.  VISUALIZED LUNGS: Clear.  MISCELLANEOUS: None.  CAROTID STENOSIS REFERENCE: Percent (%) stenosis is expressed in terms of NASCET Criteria. (NASCET = 100x1-(N/D)). N=greatest narrowing. D=normal distal diameter - MILD = <50% stenosis. - MODERATE = 50-69% stenosis. - SEVERE = 70-89% stenosis. - HAIRLINE/CRITICAL = 90-99% stenosis. - OCCLUDED = 100% stenosis.   CTA HEAD:  INTERNAL CAROTID ARTERIES: Bilateral petrous, precavernous, cavernous, and supraclinoid regions are patent. Deposition of calcified plaque with mild to moderate stenosis in association with the cavernous and supraclinoid right internal carotid artery and mild stenosis involving the cavernous and supraclinoid left internal carotid artery.  [place]: No aneurysm identified. Tiny aneurysms can be beyond the resolution of CTA technique.  ANTERIOR CEREBRAL ARTERIES: No significant stenosis or occlusion. MIDDLE CEREBRAL ARTERIES: No significant stenosis or occlusion. POSTERIOR CEREBRAL ARTERIES: No significant stenosis or occlusion.  DISTAL VERTEBRAL / BASILAR ARTERIES: No significant stenosis or occlusion.  VENOUS STRUCTURES: Visualized superficial and deep venous structures appear patent.  MISCELLANEOUS: No other vascular abnormality is seen.   IMPRESSION:  CT HEAD: No acute intracranial hemorrhage, mass effect, or midline shift. If dizziness and/or vertigo persists, consider further evaluation via MR imaging to include DWI and ADC mapping techniques, provided there are no contraindications. If patient has persistent central vertigo follow-up imaging may include dedicated pre and postcontrast MR imaging of the IAC region.  CTA NECK: No evidence of significant stenosis or occlusion.  CTA HEAD: 1. No large vessel occlusion. 2. Deposition of calcified plaque with mild to moderate stenosis in association with the cavernous and supraclinoid right internal carotid artery and mild stenosis involving the cavernous and supraclinoid left internal carotid artery..    --- End of Report ---

## 2024-12-11 NOTE — PHYSICAL EXAM
[Oriented To Time, Place, And Person] : oriented to person, place, and time [Impaired Insight] : insight and judgment were intact [Person] : oriented to person [Place] : oriented to place [Time] : oriented to time [Motor Tone] : muscle tone was normal in all four extremities [4] : T1 abductor digiti minimi 4/5 [5] : S1 ankle flexors 5/5 [Sensation Tactile Decrease] : light touch was intact [3+] : Patella left 3+ [Sclera] : the sclera and conjunctiva were normal [Neck Appearance] : the appearance of the neck was normal [] : no respiratory distress [Respiration, Rhythm And Depth] : normal respiratory rhythm and effort [Skin Color & Pigmentation] : normal skin color and pigmentation [FreeTextEntry5] : CN II-XII grossly intact [FreeTextEntry8] : unsteady gait, hesitancy with walking

## 2024-12-11 NOTE — REASON FOR VISIT
[New Patient Visit] : a new patient visit [Referred By: _________] : Patient was referred by REMBERTO [[employer] ] : provided by [employer]   [Family Member] : family member [FreeTextEntry1] : NPH workup [Interpreters_IDNumber] : [id number] [TWNoteComboBox1] : Miryam

## 2024-12-13 NOTE — REVIEW OF SYSTEMS
[Recent Weight Gain (___ Lbs)] : recent weight gain: [unfilled] lbs [Blurred Vision] : blurred vision [Nocturia] : nocturia [Negative] : Heme/Lymph

## 2024-12-14 NOTE — ASSESSMENT
[Carbohydrate Consistent Diet] : carbohydrate consistent diet [Importance of Diet and Exercise] : importance of diet and exercise to improve glycemic control, achieve weight loss and improve cardiovascular health [Self Monitoring of Blood Glucose] : self monitoring of blood glucose [Action and use of Insulin] : action and use of short and long-acting insulin [Diabetic Medications] : Risks and benefits of diabetic medications were discussed

## 2024-12-14 NOTE — HISTORY OF PRESENT ILLNESS
[FreeTextEntry1] : 74 year old M pt, with Hx of T2DM (dx. > 20 years), presents today to establish endocrine care. Other PMHx: HTN, HLD, CKD2, anemia, prostate CA dx 05/2023 s/p radiation,  No PMHx of: retinopathy, peripheral neuropathy, CAD PSHx: None FHx: Child with DM SHx: Former smoker of 30 years, no EtOH NKDA Pt's Daughter: Monica Parra, PHONE [phone]  12/13/2024 CC: "I am here for a refill of my insulin." Pt is a Cantonese speaker who presents today accompanied by his son. Pt is currently taking Lantus 22 u qpm, and Lispro 8 u ac (prescribed 9/7/7) after discharge from the hospital on 11/21/24. He reports FBS ~120s.  Pt has been on insulin for ~1 month. He was recently hospitalized for 3 days 11/19/24 due to LE edema, leg weakness, and dizziness. He was prescribed dizziness medication with benefit and follows-up with his PCP every 3 months. Pt endorses nocturia 2x, weight gain (~ 1 month), and blurred vision.   - 12/13/2024 A1c 9.4%. - 11/19/24 A1c 11.1%, LDL-c 48, TSH 0.507   [Medications verified as per pt on 12/13/2024] Current Medications: Lantus 22 u qpm, Lispro 9/7/7 u ac, Meclizine 12.5 mg PRN, Atorvastatin 10 mg qd, ASA 81 mg, ferrous sulfate 325 mg, Flomax 0.4 mg qd, Prilosec

## 2024-12-14 NOTE — END OF VISIT
[FreeTextEntry3] :  All medical record entries made by the Scribe were at my, Dr. Thomas Kinsey, direction and personally dictated by me on 12/13/2024. I have reviewed the chart and agree that the record accurately reflects my personal performance of the history, physical exam, assessment and plan. I have also personally directed, reviewed and agreed with the chart. [Time Spent: ___ minutes] : I have spent [unfilled] minutes of time on the encounter which excludes teaching and separately reported services.

## 2024-12-14 NOTE — HISTORY OF PRESENT ILLNESS
[FreeTextEntry1] : 74 year old M pt, with Hx of T2DM (dx. > 20 years), presents today to establish endocrine care. Other PMHx: HTN, HLD, CKD2, anemia, prostate CA dx 05/2023 s/p radiation,  No PMHx of: retinopathy, peripheral neuropathy, CAD PSHx: None FHx: Child with DM SHx: Former smoker of 30 years, no EtOH NKDA Pt's Daughter: Monica Parra, PHONE [phone]  12/13/2024 CC: "I am here for a refill of my insulin." Pt is a Cantonese speaker who presents today accompanied by his son. Pt is currently taking Lantus 22 u qpm, and Lispro 8 u ac (prescribed 9/7/7) after discharge from the hospital on 11/21/24. He reports FBS ~120s.  Pt has been on insulin for ~1 month. He was recently hospitalized for 3 days 11/19/24 due to LE edema, leg weakness, and dizziness. He was prescribed dizziness medication with benefit and follows-up with his PCP every 3 months. Pt endorses nocturia 2x, weight gain (~ 1 month), and blurred vision.   - 12/13/2024 A1c 9.4%. - 11/19/24 A1c 11.1%, LDL-c 48, TSH 0.507   [Medications verified as per pt on 12/13/2024] Current Medications: Lantus 22 u qpm, Lispro 9/7/7 u ac, Meclizine 12.5 mg PRN, Atorvastatin 10 mg qd, ASA 81 mg, ferrous sulfate 325 mg, Flomax 0.4 mg qd, Prilosec

## 2024-12-14 NOTE — PHYSICAL EXAM
[No Acute Distress] : no acute distress [Normal Sclera/Conjunctiva] : normal sclera/conjunctiva [No Proptosis] : no proptosis [Normal Outer Ear/Nose] : the ears and nose were normal in appearance [Thyroid Not Enlarged] : the thyroid was not enlarged [No Thyroid Nodules] : no palpable thyroid nodules [Clear to Auscultation] : lungs were clear to auscultation bilaterally [Normal Rate] : heart rate was normal [No Edema] : no peripheral edema [Soft] : abdomen soft [Spine Straight] : spine straight [No Stigmata of Cushings Syndrome] : no stigmata of Cushings Syndrome [Normal Gait] : normal gait [Normal Strength/Tone] : muscle strength and tone were normal [No Rash] : no rash [Vibration Dec.] : diminished vibratory sensation at the level of the toes [Normal Reflexes] : deep tendon reflexes were 2+ and symmetric [No Tremors] : no tremors [Oriented x3] : oriented to person, place, and time [Kyphosis] : no kyphosis present [Acanthosis Nigricans] : no acanthosis nigricans

## 2024-12-14 NOTE — ADDENDUM
[FreeTextEntry1] : I, Landon You act solely as a scribe for Dr. Thomas Kinsey on this date 12/13/2024

## 2025-01-27 NOTE — ASSESSMENT
[FreeTextEntry1] : Type 2 DM, with improving control, complications including CKD, albuminuria, diabetic retinopathy C-peptide 0.1 with glucose 89 during Nov hospitalization   Current regimen: Lantus 19 units daily, Lispro 6-8 units with meals (according to scale)  CGM analysis reveals generally well controlled glucose with above regimen, appears FBS ~120-130 and 2hr PPBS<180. A1c in office 8.3%, improved from 11% during hospitalization in Nov 2024, and GMI on CGM is 7% over past 2 weeks. Brito and Monica are engaged in DM control and we discussed insulin kinetics, consistent carb diet, general principles of insulin:carb ratio, and hypoglycemia management.   Goal A1c <7%, A1c 8.3% Goal BP <130/80, BP today 138/78 , not on regimen (checks at home, usually 120/80, per daughter) Goal LDL <100mg/dL, recent LDL 36, on atorvastatin 20mg daily  Plan -- Repeat c-peptide today, check antibodies -- Continue Lantus 19 units daily, Lispro 6-8 units with meals (according to scale as above) -- Discussed general principles of insulin dose titration with large/small meals, pre-meal hyperglycemia, fasting hyper/hypoglycemia with goal of maintaining euglycemia -- Continue CGM use -- indicated for MDI insulin use. Will send through [facility] for DME supplier as well as through [facility] -- Reviewed hypoglycemia protocol (15/15 rule) -- Continue to follow up with ophthalmologist. Podiatry referral given -- Follow up with Dr. Kinsey as planned in April (~2.5 months)

## 2025-01-27 NOTE — ADDENDUM
[FreeTextEntry1] : 1/23/25: Called Monica to discuss labs from 1/22, yesterday.  C-peptide 1.0 w/glucose 169 -- low pancreatic reserve/insulin secretion -- discussed that he needs insulin as his body isn't making enough.  Pending BEAU antibodies, but discussed this could also be from long history of DM and low pancreatic reserve at this point in illness. She is aware he needs insulin and will continue regimen as discussed yesterday  1/24/25: Spoke w/Monica -- she requested I fill out PA for FSL in case her father can get it at retail pharmacy. Was unable to do so on [employer] Key [id number] given medicare part D coverage [facility] confirmed pt insurance w/medicare for DME shipment, she said he also has [facility] coverage -- ID # [id number] -- will submit paper form for CGM to [facility] in case this is strategy for him to receive.  1/27/25 -- BEAU antibodies negative. Low c-peptide seemingly likely to low insulin reserve given long history of DM.  Checked [facility] order -- "all requested documentation is approved and order released to pt" I had submitted to [facility], received notice that Medicare is primary and need to contact Part D plan for coverage of this medication (Medicare is what was billed via [facility] so no further action needed)

## 2025-01-27 NOTE — REVIEW OF SYSTEMS
[Recent Weight Gain (___ Lbs)] : recent weight gain: [unfilled] lbs [Dizziness] : dizziness [Difficulty Walking] : difficulty walking [Negative] : Endocrine [de-identified] : thinks symptoms have improved with greater glucose control

## 2025-01-27 NOTE — ASSESSMENT
[FreeTextEntry1] : Type 2 DM, with improving control, complications including CKD, albuminuria, diabetic retinopathy C-peptide 0.1 with glucose 89 during Nov hospitalization   Current regimen: Lantus 19 units daily, Lispro 6-8 units with meals (according to scale)  CGM analysis reveals generally well controlled glucose with above regimen, appears FBS ~120-130 and 2hr PPBS<180. A1c in office 8.3%, improved from 11% during hospitalization in Nov 2024, and GMI on CGM is 7% over past 2 weeks. Brito and Monica are engaged in DM control and we discussed insulin kinetics, consistent carb diet, general principles of insulin:carb ratio, and hypoglycemia management.   Goal A1c <7%, A1c 8.3% Goal BP <130/80, BP today 138/78 , not on regimen (checks at home, usually 120/80, per daughter) Goal LDL <100mg/dL, recent LDL 36, on atorvastatin 20mg daily  Plan -- Repeat c-peptide today, check antibodies -- Continue Lantus 19 units daily, Lispro 6-8 units with meals (according to scale as above) -- Discussed general principles of insulin dose titration with large/small meals, pre-meal hyperglycemia, fasting hyper/hypoglycemia with goal of maintaining euglycemia -- Continue CGM use -- indicated for MDI insulin use. Will send through [facility] for DME supplier as well as through [facility] -- Reviewed hypoglycemia protocol (15/15 rule) -- Continue to follow up with ophthalmologist. Podiatry referral given -- Follow up with Dr. Kinsey as planned in April (~2.5 months)

## 2025-01-27 NOTE — PHYSICAL EXAM
[Alert] : alert [No Acute Distress] : no acute distress [EOMI] : extra ocular movement intact [Normal Hearing] : hearing was normal [No Respiratory Distress] : no respiratory distress [No Accessory Muscle Use] : no accessory muscle use [Normal Rate and Effort] : normal respiratory rate and effort [Clear to Auscultation] : lungs were clear to auscultation bilaterally [Normal S1, S2] : normal S1 and S2 [Normal Rate] : heart rate was normal [Normal Bowel Sounds] : normal bowel sounds [Not Distended] : not distended [Spine Straight] : spine straight [No Stigmata of Cushings Syndrome] : no stigmata of Cushings Syndrome [Normal Affect] : the affect was normal [Kyphosis] : no kyphosis present [de-identified] : Slowed gait

## 2025-01-27 NOTE — ASSESSMENT
[FreeTextEntry1] : Type 2 DM, with improving control, complications including CKD, albuminuria, diabetic retinopathy C-peptide 0.1 with glucose 89 during Nov hospitalization   Current regimen: Lantus 19 units daily, Lispro 6-8 units with meals (according to scale)  CGM analysis reveals generally well controlled glucose with above regimen, appears FBS ~120-130 and 2hr PPBS<180. A1c in office 8.3%, improved from 11% during hospitalization in Nov 2024, and GMI on CGM is 7% over past 2 weeks. Brito and Monica are engaged in DM control and we discussed insulin kinetics, consistent carb diet, general principles of insulin:carb ratio, and hypoglycemia management.   Goal A1c <7%, A1c 8.3% Goal BP <130/80, BP today 138/78 , not on regimen (checks at home, usually 120/80, per daughter) Goal LDL <100mg/dL, recent LDL 36, on atorvastatin 20mg daily  Plan -- Repeat c-peptide today, check antibodies -- Continue Lantus 19 units daily, Lispro 6-8 units with meals (according to scale as above) -- Discussed general principles of insulin dose titration with large/small meals, pre-meal hyperglycemia, fasting hyper/hypoglycemia with goal of maintaining euglycemia -- Continue CGM use -- indicated for MDI insulin use. Will send through [place] for DME supplier as well as through [employer] -- Reviewed hypoglycemia protocol (15/15 rule) -- Continue to follow up with ophthalmologist. Podiatry referral given -- Follow up with Dr. Kinsey as planned in April (~2.5 months)

## 2025-01-27 NOTE — ADDENDUM
[FreeTextEntry1] : 1/23/25: Called Monica to discuss labs from 1/22, yesterday.  C-peptide 1.0 w/glucose 169 -- low pancreatic reserve/insulin secretion -- discussed that he needs insulin as his body isn't making enough.  Pending BEAU antibodies, but discussed this could also be from long history of DM and low pancreatic reserve at this point in illness. She is aware he needs insulin and will continue regimen as discussed yesterday  1/24/25: Spoke w/Monica -- she requested I fill out PA for FSL in case her father can get it at retail pharmacy. Was unable to do so on CMM Key [id number] given medicare part D coverage [facility] confirmed pt insurance w/medicare for DME shipment, she said he also has [facility] coverage -- ID # [id number] -- will submit paper form for CGM to [facility] in case this is strategy for him to receive.  1/27/25 -- BEAU antibodies negative. Low c-peptide seemingly likely to low insulin reserve given long history of DM.  Checked [place] order -- "all requested documentation is approved and order released to pt" I had submitted to [facility], received notice that Medicare is primary and need to contact Part D plan for coverage of this medication (Medicare is what was billed via [place] so no further action needed)

## 2025-01-27 NOTE — HISTORY OF PRESENT ILLNESS
[FreeTextEntry1] : Daryl Parra is a 74 year old male who presents for follow up on diabetes management. He is accompanied by his daughter, Monica, who served as  during the visit. DM diagnosed >20 yrs ago Complications: CKD stage 2, albuminuria, diabetic retinopathy   Patient of Dr. Kinsey, last seen December 13, 2024   PMHx: Type 2 DM, vertigo, HTN, HLD, CKD stage 2, anemia, prostate cancer (diagnosed 05/2023, s/p radiation) PSHx: None FMHx: DM (child) NKDA  Hospitalized in November 2024 for sensation of room spinning, dizziness, and feeling weak in his legs and off balance while walking.  Imaging did not support acute stroke. Now undergoing further evaluation with ENT, Neurology, and neurosurgery.  A1c in hospital was 11%, c-peptide 0.1 with glucose 89  Has been working on improving glucose control, he and Monica are engaged with glucose goals, DM regimen He is feeling a bit better, more active, now that his blood glucose is better controlled Feels that his walking is smoother/easier Notes a 30 point difference between sensor and FS in office -- sensor shows 190, FS in office 221  Current medication regimen: -- Basaglar 19 units nightly -- Lispro 6-8 units with meals -- if premeal BG <100, gives 6 units, if pre-meal -150, gives self 7 units, if premeal -200, gives self 8 units   POCT A1c in office: 8.3% FS in office: 221 is ~2hrs PPBS -- FS read in 60s on CGM (no symptoms, did not check FS), corrected with almond milk/apple, then ate oatmeal with nuts, gave self Lispro 7 units around 8AM (possibly false low given differential as above)   Daryl measures blood glucose via Freestyle Avril 3+ continuous glucose monitor: FBS: 120-130 PPBS: <180 Hypoglycemia: occasional, fasting   Daryl has not shared glucose data w/clinic, but we reviewed results from last 14 days via the renee on his phone. % time with CGM in use: 93%   Time high: 29% (time BG >251: 2%) Time in range: 70% Time low: 1% (time BG <54: 0%)   GMI 7.0% Average glucose: 155   CGM analysis reveals: Generally well controlled glucose. Postprandial spikes that are not consistently >180 at peak, and generally return to <180 2 hrs after meal. Appears FBS/PPBS at goal.    Saw vascular -- having varicose vein surgery next month Podiatry: has not seen, will refer Ophthalmology: December 2024 -- + DR, no interventions taken, needs 3-6 month follow up   Current Medications: Lantus 19 u qpm, Lispro 6-8u ac, Meclizine 12.5 mg PRN, Atorvastatin 20 mg qd, ASA 81 mg, ferrous sulfate 325 mg, Flomax 0.4 mg qd, Prilosec

## 2025-01-27 NOTE — ASSESSMENT
[FreeTextEntry1] : Type 2 DM, with improving control, complications including CKD, albuminuria, diabetic retinopathy C-peptide 0.1 with glucose 89 during Nov hospitalization   Current regimen: Lantus 19 units daily, Lispro 6-8 units with meals (according to scale)  CGM analysis reveals generally well controlled glucose with above regimen, appears FBS ~120-130 and 2hr PPBS<180. A1c in office 8.3%, improved from 11% during hospitalization in Nov 2024, and GMI on CGM is 7% over past 2 weeks. Brito and Monica are engaged in DM control and we discussed insulin kinetics, consistent carb diet, general principles of insulin:carb ratio, and hypoglycemia management.   Goal A1c <7%, A1c 8.3% Goal BP <130/80, BP today 138/78 , not on regimen (checks at home, usually 120/80, per daughter) Goal LDL <100mg/dL, recent LDL 36, on atorvastatin 20mg daily  Plan -- Repeat c-peptide today, check antibodies -- Continue Lantus 19 units daily, Lispro 6-8 units with meals (according to scale as above) -- Discussed general principles of insulin dose titration with large/small meals, pre-meal hyperglycemia, fasting hyper/hypoglycemia with goal of maintaining euglycemia -- Continue CGM use -- indicated for MDI insulin use. Will send through [place] for DME supplier as well as through [employer] -- Reviewed hypoglycemia protocol (15/15 rule) -- Continue to follow up with ophthalmologist. Podiatry referral given -- Follow up with Dr. Kinsey as planned in April (~2.5 months)

## 2025-01-27 NOTE — PHYSICAL EXAM
[Alert] : alert [No Acute Distress] : no acute distress [EOMI] : extra ocular movement intact [Normal Hearing] : hearing was normal [No Respiratory Distress] : no respiratory distress [No Accessory Muscle Use] : no accessory muscle use [Normal Rate and Effort] : normal respiratory rate and effort [Clear to Auscultation] : lungs were clear to auscultation bilaterally [Normal S1, S2] : normal S1 and S2 [Normal Rate] : heart rate was normal [Normal Bowel Sounds] : normal bowel sounds [Not Distended] : not distended [Spine Straight] : spine straight [No Stigmata of Cushings Syndrome] : no stigmata of Cushings Syndrome [Normal Affect] : the affect was normal [Kyphosis] : no kyphosis present [de-identified] : Slowed gait

## 2025-01-27 NOTE — REVIEW OF SYSTEMS
[Recent Weight Gain (___ Lbs)] : recent weight gain: [unfilled] lbs [Dizziness] : dizziness [Difficulty Walking] : difficulty walking [Negative] : Endocrine [de-identified] : thinks symptoms have improved with greater glucose control

## 2025-01-27 NOTE — REVIEW OF SYSTEMS
[Recent Weight Gain (___ Lbs)] : recent weight gain: [unfilled] lbs [Dizziness] : dizziness [Difficulty Walking] : difficulty walking [Negative] : Endocrine [de-identified] : thinks symptoms have improved with greater glucose control

## 2025-01-27 NOTE — ADDENDUM
[FreeTextEntry1] : 1/23/25: Called Monica to discuss labs from 1/22, yesterday.  C-peptide 1.0 w/glucose 169 -- low pancreatic reserve/insulin secretion -- discussed that he needs insulin as his body isn't making enough.  Pending BEAU antibodies, but discussed this could also be from long history of DM and low pancreatic reserve at this point in illness. She is aware he needs insulin and will continue regimen as discussed yesterday  1/24/25: Spoke w/Monica -- she requested I fill out PA for FSL in case her father can get it at retail pharmacy. Was unable to do so on CMM Key [id number] given medicare part D coverage [facility] confirmed pt insurance w/medicare for DME shipment, she said he also has [facility] coverage -- ID # [id number] -- will submit paper form for CGM to [facility] in case this is strategy for him to receive.  1/27/25 -- BEAU antibodies negative. Low c-peptide seemingly likely to low insulin reserve given long history of DM.  Checked [place] order -- "all requested documentation is approved and order released to pt" I had submitted to [facility], received notice that Medicare is primary and need to contact Part D plan for coverage of this medication (Medicare is what was billed via [place] so no further action needed)

## 2025-01-27 NOTE — DATA REVIEWED
[FreeTextEntry1] : 12/13/24 A1c 10.1% ACR 41 S. Creatinine 0.64, GFR 99 AST 64, ALT 91, Alk Phos 135 Tg 112, total-c 158, , LDL 36 TSH 0.97

## 2025-01-27 NOTE — ADDENDUM
[FreeTextEntry1] : 1/23/25: Called Monica to discuss labs from 1/22, yesterday.  C-peptide 1.0 w/glucose 169 -- low pancreatic reserve/insulin secretion -- discussed that he needs insulin as his body isn't making enough.  Pending BEAU antibodies, but discussed this could also be from long history of DM and low pancreatic reserve at this point in illness. She is aware he needs insulin and will continue regimen as discussed yesterday  1/24/25: Spoke w/Monica -- she requested I fill out PA for FSL in case her father can get it at retail pharmacy. Was unable to do so on CMM Key [id number] given medicare part D coverage [facility] confirmed pt insurance w/medicare for DME shipment, she said he also has [facility] coverage -- ID # [id number] -- will submit paper form for CGM to [facility] in case this is strategy for him to receive.  1/27/25 -- BEAU antibodies negative. Low c-peptide seemingly likely to low insulin reserve given long history of DM.  Checked [place] order -- "all requested documentation is approved and order released to pt" I had submitted to [facility], received notice that Medicare is primary and need to contact Part D plan for coverage of this medication (Medicare is what was billed via [place] so no further action needed)

## 2025-01-27 NOTE — PHYSICAL EXAM
[Alert] : alert [No Acute Distress] : no acute distress [EOMI] : extra ocular movement intact [Normal Hearing] : hearing was normal [No Respiratory Distress] : no respiratory distress [No Accessory Muscle Use] : no accessory muscle use [Normal Rate and Effort] : normal respiratory rate and effort [Clear to Auscultation] : lungs were clear to auscultation bilaterally [Normal S1, S2] : normal S1 and S2 [Normal Rate] : heart rate was normal [Normal Bowel Sounds] : normal bowel sounds [Not Distended] : not distended [Spine Straight] : spine straight [No Stigmata of Cushings Syndrome] : no stigmata of Cushings Syndrome [Normal Affect] : the affect was normal [Kyphosis] : no kyphosis present [de-identified] : Slowed gait

## 2025-01-27 NOTE — PHYSICAL EXAM
[Alert] : alert [No Acute Distress] : no acute distress [EOMI] : extra ocular movement intact [Normal Hearing] : hearing was normal [No Respiratory Distress] : no respiratory distress [No Accessory Muscle Use] : no accessory muscle use [Normal Rate and Effort] : normal respiratory rate and effort [Clear to Auscultation] : lungs were clear to auscultation bilaterally [Normal S1, S2] : normal S1 and S2 [Normal Rate] : heart rate was normal [Normal Bowel Sounds] : normal bowel sounds [Not Distended] : not distended [Spine Straight] : spine straight [No Stigmata of Cushings Syndrome] : no stigmata of Cushings Syndrome [Normal Affect] : the affect was normal [Kyphosis] : no kyphosis present [de-identified] : Slowed gait

## 2025-01-27 NOTE — PHYSICAL EXAM
[Alert] : alert [No Acute Distress] : no acute distress [EOMI] : extra ocular movement intact [Normal Hearing] : hearing was normal [No Respiratory Distress] : no respiratory distress [No Accessory Muscle Use] : no accessory muscle use [Normal Rate and Effort] : normal respiratory rate and effort [Clear to Auscultation] : lungs were clear to auscultation bilaterally [Normal S1, S2] : normal S1 and S2 [Normal Rate] : heart rate was normal [Normal Bowel Sounds] : normal bowel sounds [Not Distended] : not distended [Spine Straight] : spine straight [No Stigmata of Cushings Syndrome] : no stigmata of Cushings Syndrome [Normal Affect] : the affect was normal [Kyphosis] : no kyphosis present [de-identified] : Slowed gait

## 2025-01-27 NOTE — ASSESSMENT
[FreeTextEntry1] : Type 2 DM, with improving control, complications including CKD, albuminuria, diabetic retinopathy C-peptide 0.1 with glucose 89 during Nov hospitalization   Current regimen: Lantus 19 units daily, Lispro 6-8 units with meals (according to scale)  CGM analysis reveals generally well controlled glucose with above regimen, appears FBS ~120-130 and 2hr PPBS<180. A1c in office 8.3%, improved from 11% during hospitalization in Nov 2024, and GMI on CGM is 7% over past 2 weeks. Brito and Monica are engaged in DM control and we discussed insulin kinetics, consistent carb diet, general principles of insulin:carb ratio, and hypoglycemia management.   Goal A1c <7%, A1c 8.3% Goal BP <130/80, BP today 138/78 , not on regimen (checks at home, usually 120/80, per daughter) Goal LDL <100mg/dL, recent LDL 36, on atorvastatin 20mg daily  Plan -- Repeat c-peptide today, check antibodies -- Continue Lantus 19 units daily, Lispro 6-8 units with meals (according to scale as above) -- Discussed general principles of insulin dose titration with large/small meals, pre-meal hyperglycemia, fasting hyper/hypoglycemia with goal of maintaining euglycemia -- Continue CGM use -- indicated for MDI insulin use. Will send through [place] for DME supplier as well as through [employer] -- Reviewed hypoglycemia protocol (15/15 rule) -- Continue to follow up with ophthalmologist. Podiatry referral given -- Follow up with Dr. Kinsey as planned in April (~2.5 months)

## 2025-01-27 NOTE — REVIEW OF SYSTEMS
[Recent Weight Gain (___ Lbs)] : recent weight gain: [unfilled] lbs [Dizziness] : dizziness [Difficulty Walking] : difficulty walking [Negative] : Endocrine [de-identified] : thinks symptoms have improved with greater glucose control

## 2025-01-27 NOTE — REVIEW OF SYSTEMS
[Recent Weight Gain (___ Lbs)] : recent weight gain: [unfilled] lbs [Dizziness] : dizziness [Difficulty Walking] : difficulty walking [Negative] : Endocrine [de-identified] : thinks symptoms have improved with greater glucose control

## 2025-01-27 NOTE — ADDENDUM
[FreeTextEntry1] : 1/23/25: Called Monica to discuss labs from 1/22, yesterday.  C-peptide 1.0 w/glucose 169 -- low pancreatic reserve/insulin secretion -- discussed that he needs insulin as his body isn't making enough.  Pending BEAU antibodies, but discussed this could also be from long history of DM and low pancreatic reserve at this point in illness. She is aware he needs insulin and will continue regimen as discussed yesterday  1/24/25: Spoke w/Monica -- she requested I fill out PA for FSL in case her father can get it at retail pharmacy. Was unable to do so on CMM Key [id number] given medicare part D coverage [facility] confirmed pt insurance w/medicare for DME shipment, she said he also has [facility] coverage -- ID # [id number] -- will submit paper form for CGM to [facility] in case this is strategy for him to receive.  1/27/25 -- BEAU antibodies negative. Low c-peptide seemingly likely to low insulin reserve given long history of DM.  Checked [place] order -- "all requested documentation is approved and order released to pt" I had submitted to [facility], received notice that Medicare is primary and need to contact Part D plan for coverage of this medication (Medicare is what was billed via [place] so no further action needed)

## 2025-02-05 NOTE — HISTORY OF PRESENT ILLNESS
[FreeTextEntry1] : 73 y/o [place] speaking Male with PMHx of prostate CA 2023 s/p radiation, elevated BP w/o hx HTN, HLD, DM, CKD stage 2, anemia, who was recently admitted at [facility] Nov 2024 for feelings of dizziness/ off balance while walking, referred by neurology for NPH workup.  Ad part of hospital workup: - CTH negative for acute path - CTA with deposition of calcified plaque with mild to moderate stenosis in association with the cavernous and supraclinoid right internal carotid artery and mild stenosis involving the cavernous and supraclinoid left internal carotid artery. Not a candidate for acute intervention. - MRI negative for acute infarct; with prominence of the temporal horns; findings may be due to volume loss though early hydrocephalus cannot be excluded.  He saw neurology outpatient for follow- up. Was referred for ENT, vestibular therapy for dizziness, referred to neurosurgery due to MRI findings for NPH workup. He saw ENT 12/9 and referred for vestibular therapy.  12/11/24 pt presented for initial evaluation.  Endorsed trouble walking described as "wobbliness" for about one month, not worse with turning. Denied urinary incontinence or memory issues.  Denied neck pain, weakness, but did note some numbness to the front of his left thigh. Ordered for MRI lumbar spine and MRI brain with CSF flow for further workup.   1/5/25 MRI lumbar spine:  - Congenital spinal canal stenosis with superimposed degenerative changes most prominent at L3-4 and L4-5. - L3-4 disc bulge and facet arthrosis contribute to severe spinal canal stenosis and severe right and moderate to severe left neuroforaminal stenosis. - L4-5 severe spinal canal and severe right and moderate to severe left neuroforaminal stenosis.  1/5/25 MRI Brain with CSF flow:  - Again demonstrated is mild prominence of the ventricles which is stable from 11/20/2024. There is disproportionate enlargement of the sylvian fissures and crowding the cerebral convexity which can be seen in normal pressure hydrocephalus. However other etiologies would include asymmetric volume loss and clinical correlation is recommended. - No CSF flow obstruction.  Returns TODAY for follow- up and imaging review. Accompanied by his daughter who assists in translation.  Patient's biggest complaint is leg weakness and difficulty walking s/t leg weakness. Also with lower back pain that continues to worsen.  The anterior left thigh numbness that he noted at last visit has improved over the past month.  Some memory issues per patient's daughter described as forgetfulness.    Neurology: KYE Cardoza Endo: Eliza Awan ENT: Nino Dickey

## 2025-02-05 NOTE — DATA REVIEWED
[de-identified] : ACC: [id number]     EXAM:  MR BRAIN W CSF FLOW   ORDERED BY: CLYDE HARRISEDY  PROCEDURE DATE:  01/05/2025    INTERPRETATION:  CLINICAL INDICATION: NPH workup. Evaluate for CSF obstruction  TECHNIQUE: Multi-planar multi-sequential MR imaging of the brain was performed without intravenous contrast. CSF flow study using 2-D velocity encoded sequences were also obtained.  COMPARISON: MRI brain 11/20/2024  FINDINGS:  There is mild diffuse prominence of the ventricular system mild disproportionate enlargement of the sylvian fissures and crowding at the cerebral convexity. Ventricles are stable in size and morphology since the prior MRI.  The CSF flow study demonstrates flow within the foramen of Monro, cerebral aqueduct and and at the cervicomedullary junction. There is no evidence of CSF obstruction.  No acute infarction, intracranial hemorrhage or mass.  There are no extra-axial fluid collections. The dominant arterial skull base flow voids are present.  The visualized intraorbital contents are normal. Scattered inflammatory changes in the anterior ethmoid air cells. Remainder of the paranasal sinuses are clear. The mastoid air cells are clear. The visualized soft tissues and osseous structures appear normal.   IMPRESSION:  Again demonstrated is mild prominence of the ventricles which is stable from 11/20/2024. There is disproportionate enlargement of the sylvian fissures and crowding the cerebral convexity which can be seen in normal pressure hydrocephalus. However other etiologies would include asymmetric volume loss and clinical correlation is recommended.  No CSF flow obstruction.  --- End of Report ---  [de-identified] : ACC: [id number]     EXAM:  MR SPINE LUMBAR   ORDERED BY: CLYDE CORTES  PROCEDURE DATE:  01/05/2025    INTERPRETATION:  CLINICAL INDICATION: NPH workup. Imbalance, urinary retention, hyperreflexia.  ADDITIONAL CLINICAL INFORMATION: Cancer C80.1  TECHNIQUE: Multiplanar multisequence MRI of the lumbar spine was performed without the administration of intravenous contrast, according to standard protocol.  COMPARISON: None  FINDINGS:  ALIGNMENT: Trace retrolisthesis of L1 on L2 and trace grade 1 anterolisthesis of L5 on S1. Mild S-shaped curvature of the thoracolumbar spine.  VERTEBRAE: The vertebral bodies are normal in height. There is no acute fracture or aggressive osseous lesion.  DISCS: Multilevel disc desiccation with moderate loss of height at L4-5 and L5-S1. There is linear STIR signal hyperintensity in the L3 disc space, which may represent annular fissure.  There is congenital spinal canal stenosis most prominent from L2 inferiorly.  EVALUATION OF INDIVIDUAL LEVELS:  L1-2: Disc bulge with small right sided posterior annular fissure and superimposed small right subarticular disc protrusion which displaces the descending right L2 nerve root. Findings contribute to moderate right and mild left subarticular zone stenosis and mild right neuroforaminal stenosis. No spinal canal or left neuroforaminal stenosis.  L2-3: Diffuse disc bulge causes bilateral subarticular zone stenosis and contacts both the descending and exiting nerve roots, right greater than left. Mild facet arthrosis. Findings contribute to mild bilateral subarticular zone stenosis and mild spinal canal stenosis. There is mild to moderate left and severe right neuroforaminal stenosis.  L3-4: Diffuse disc bulge with posterior annular fissure contacts both the exiting and descending nerve roots. Mild facet arthrosis with small focus of marrow edema at the right facet joint and a trace right facet joint effusion. Ligamentum flavum thickening and prominence of the epidural fat. Findings contribute to severe spinal canal stenosis and severe right and moderate to severe left neuroforaminal stenosis.  L4-5: Diffuse disc bulge. Bilateral facet arthrosis and ligamentum flavum thickening. Findings contribute to severe spinal canal stenosis and severe right and moderate to severe left neuroforaminal stenosis.  L5-S1: Disc bulge and facet arthrosis contribute to moderate to severe bilateral neural foraminal narrowing. No spinal canal stenosis.   CONUS MEDULLARIS AND CAUDA EQUINA: The conus medullaris terminates at T12-L1. There is normal appearance of the conus medullaris and cauda equina.  PARAVERTEBRAL SOFT TISSUES AND VISUALIZED RETROPERITONEUM: The visualized paravertebral soft tissues appear within normal limits.  LIMITED EVALUATION OF UPPER SACRUM AND SACROILIAC JOINTS: There is fatty replacement of the marrow in the sacrum, most likely due to prior radiation.  IMPRESSION:  Congenital spinal canal stenosis with superimposed degenerative changes most prominent at L3-4 and L4-5.  L3-4 disc bulge and facet arthrosis contribute to severe spinal canal stenosis and severe right and moderate to severe left neuroforaminal stenosis.  L4-5 severe spinal canal and severe right and moderate to severe left neuroforaminal stenosis.  --- End of Report ---     ROSA YANG MD; Resident Radiologist This document has been electronically signed. ZULEYMA GRAY MD; Attending Radiologist This document has been electronically signed. Jan 7 2025 11:56AM

## 2025-02-05 NOTE — HISTORY OF PRESENT ILLNESS
[FreeTextEntry1] : 75 y/o [place] speaking Male with PMHx of prostate CA 2023 s/p radiation, elevated BP w/o hx HTN, HLD, DM, CKD stage 2, anemia, who was recently admitted at [facility] Nov 2024 for feelings of dizziness/ off balance while walking, referred by neurology for NPH workup.  Ad part of hospital workup: - CTH negative for acute path - CTA with deposition of calcified plaque with mild to moderate stenosis in association with the cavernous and supraclinoid right internal carotid artery and mild stenosis involving the cavernous and supraclinoid left internal carotid artery. Not a candidate for acute intervention. - MRI negative for acute infarct; with prominence of the temporal horns; findings may be due to volume loss though early hydrocephalus cannot be excluded.  He saw neurology outpatient for follow- up. Was referred for ENT, vestibular therapy for dizziness, referred to neurosurgery due to MRI findings for NPH workup. He saw ENT 12/9 and referred for vestibular therapy.  12/11/24 pt presented for initial evaluation.  Endorsed trouble walking described as "wobbliness" for about one month, not worse with turning. Denied urinary incontinence or memory issues.  Denied neck pain, weakness, but did note some numbness to the front of his left thigh. Ordered for MRI lumbar spine and MRI brain with CSF flow for further workup.   1/5/25 MRI lumbar spine:  - Congenital spinal canal stenosis with superimposed degenerative changes most prominent at L3-4 and L4-5. - L3-4 disc bulge and facet arthrosis contribute to severe spinal canal stenosis and severe right and moderate to severe left neuroforaminal stenosis. - L4-5 severe spinal canal and severe right and moderate to severe left neuroforaminal stenosis.  1/5/25 MRI Brain with CSF flow:  - Again demonstrated is mild prominence of the ventricles which is stable from 11/20/2024. There is disproportionate enlargement of the sylvian fissures and crowding the cerebral convexity which can be seen in normal pressure hydrocephalus. However other etiologies would include asymmetric volume loss and clinical correlation is recommended. - No CSF flow obstruction.  Returns TODAY for follow- up and imaging review. Accompanied by his daughter who assists in translation.  Patient's biggest complaint is leg weakness and difficulty walking s/t leg weakness. Also with lower back pain that continues to worsen.  The anterior left thigh numbness that he noted at last visit has improved over the past month.  Some memory issues per patient's daughter described as forgetfulness.    Neurology: KYE Cardoza Endo: lEiza Awan ENT: Nino Dickey

## 2025-02-05 NOTE — DATA REVIEWED
[de-identified] : ACC: [id number]     EXAM:  MR BRAIN W CSF FLOW   ORDERED BY: CLYDE HARRISEDY  PROCEDURE DATE:  01/05/2025    INTERPRETATION:  CLINICAL INDICATION: NPH workup. Evaluate for CSF obstruction  TECHNIQUE: Multi-planar multi-sequential MR imaging of the brain was performed without intravenous contrast. CSF flow study using 2-D velocity encoded sequences were also obtained.  COMPARISON: MRI brain 11/20/2024  FINDINGS:  There is mild diffuse prominence of the ventricular system mild disproportionate enlargement of the sylvian fissures and crowding at the cerebral convexity. Ventricles are stable in size and morphology since the prior MRI.  The CSF flow study demonstrates flow within the foramen of Monro, cerebral aqueduct and and at the cervicomedullary junction. There is no evidence of CSF obstruction.  No acute infarction, intracranial hemorrhage or mass.  There are no extra-axial fluid collections. The dominant arterial skull base flow voids are present.  The visualized intraorbital contents are normal. Scattered inflammatory changes in the anterior ethmoid air cells. Remainder of the paranasal sinuses are clear. The mastoid air cells are clear. The visualized soft tissues and osseous structures appear normal.   IMPRESSION:  Again demonstrated is mild prominence of the ventricles which is stable from 11/20/2024. There is disproportionate enlargement of the sylvian fissures and crowding the cerebral convexity which can be seen in normal pressure hydrocephalus. However other etiologies would include asymmetric volume loss and clinical correlation is recommended.  No CSF flow obstruction.  --- End of Report ---  [de-identified] : ACC: [id number]     EXAM:  MR SPINE LUMBAR   ORDERED BY: CLYDE CORTES  PROCEDURE DATE:  01/05/2025    INTERPRETATION:  CLINICAL INDICATION: NPH workup. Imbalance, urinary retention, hyperreflexia.  ADDITIONAL CLINICAL INFORMATION: Cancer C80.1  TECHNIQUE: Multiplanar multisequence MRI of the lumbar spine was performed without the administration of intravenous contrast, according to standard protocol.  COMPARISON: None  FINDINGS:  ALIGNMENT: Trace retrolisthesis of L1 on L2 and trace grade 1 anterolisthesis of L5 on S1. Mild S-shaped curvature of the thoracolumbar spine.  VERTEBRAE: The vertebral bodies are normal in height. There is no acute fracture or aggressive osseous lesion.  DISCS: Multilevel disc desiccation with moderate loss of height at L4-5 and L5-S1. There is linear STIR signal hyperintensity in the L3 disc space, which may represent annular fissure.  There is congenital spinal canal stenosis most prominent from L2 inferiorly.  EVALUATION OF INDIVIDUAL LEVELS:  L1-2: Disc bulge with small right sided posterior annular fissure and superimposed small right subarticular disc protrusion which displaces the descending right L2 nerve root. Findings contribute to moderate right and mild left subarticular zone stenosis and mild right neuroforaminal stenosis. No spinal canal or left neuroforaminal stenosis.  L2-3: Diffuse disc bulge causes bilateral subarticular zone stenosis and contacts both the descending and exiting nerve roots, right greater than left. Mild facet arthrosis. Findings contribute to mild bilateral subarticular zone stenosis and mild spinal canal stenosis. There is mild to moderate left and severe right neuroforaminal stenosis.  L3-4: Diffuse disc bulge with posterior annular fissure contacts both the exiting and descending nerve roots. Mild facet arthrosis with small focus of marrow edema at the right facet joint and a trace right facet joint effusion. Ligamentum flavum thickening and prominence of the epidural fat. Findings contribute to severe spinal canal stenosis and severe right and moderate to severe left neuroforaminal stenosis.  L4-5: Diffuse disc bulge. Bilateral facet arthrosis and ligamentum flavum thickening. Findings contribute to severe spinal canal stenosis and severe right and moderate to severe left neuroforaminal stenosis.  L5-S1: Disc bulge and facet arthrosis contribute to moderate to severe bilateral neural foraminal narrowing. No spinal canal stenosis.   CONUS MEDULLARIS AND CAUDA EQUINA: The conus medullaris terminates at T12-L1. There is normal appearance of the conus medullaris and cauda equina.  PARAVERTEBRAL SOFT TISSUES AND VISUALIZED RETROPERITONEUM: The visualized paravertebral soft tissues appear within normal limits.  LIMITED EVALUATION OF UPPER SACRUM AND SACROILIAC JOINTS: There is fatty replacement of the marrow in the sacrum, most likely due to prior radiation.  IMPRESSION:  Congenital spinal canal stenosis with superimposed degenerative changes most prominent at L3-4 and L4-5.  L3-4 disc bulge and facet arthrosis contribute to severe spinal canal stenosis and severe right and moderate to severe left neuroforaminal stenosis.  L4-5 severe spinal canal and severe right and moderate to severe left neuroforaminal stenosis.  --- End of Report ---     ROSA YANG MD; Resident Radiologist This document has been electronically signed. ZULEYMA GRAY MD; Attending Radiologist This document has been electronically signed. Jan 7 2025 11:56AM

## 2025-02-05 NOTE — ASSESSMENT
[FreeTextEntry1] : This 74-year-old male with a history of prostate cancer, hypertension, hypercholesterolemia, diabetes, CKD stage 2, and anemia presents with complaints of gait instability, cognitive decline (forgetting addresses), lower back pain, left thigh numbness, and new-onset shoulder pain.  He previously underwent evaluation for normal pressure hydrocephalus (NPH). He is now experiencing difficulty climbing stairs and requires assistance. An MRI showed mild prominence of the ventricles with disproportionate enlargement of the Sylvian fissures and crowding. Lumbar stenosis was also noted, although predominantly on the right, while the patient's symptoms are on the left.  Review of prior scans showed arthritis from L2-L5 with slight disc bulging and stenosis or tightness around the nerve roots.  The plan is to perform a spinal tap to assess for NPH.  If there is improvement, a shunt procedure will be considered.  An MRI of the cervical spine is recommended to evaluate his shoulder pain, pending a physical exam.  Depending on the response to the spinal tap, spine surgery may be necessary to address the back pain and numbness if they persist.   After detailed imaging review and patient examination, Dr. D'Amico discussed high volume lumbar puncture with patient and his daughter. Potential surgical risks vs benefits and alternatives discussed with time allotted for questions and answers given.  Patient/daughter verbalizes understanding and wishes to proceed with procedure.  Date: TBD. Coordinator will call to set up date/time.   Pre-op clearance packet sent to patient's daughter. Will need medical and cardiac clearances.    Patient/daughter verbalizes understanding of today's discussion and next steps in treatment plan.   Today, my ACP, Lauren Arceo, was here to observe my evaluation and management services for this new problem/exacerbated condition to be followed going forward.     A total of 30 minutes was spent reviewing the labs, imaging and physical examination of the patient. We discussed the diagnosis, and the plan. The patient's questions were answered. The patient demonstrated an excellent understanding of the plan.

## 2025-02-05 NOTE — REASON FOR VISIT
[Follow-Up: _____] : a [unfilled] follow-up visit [Home] : at home, [unfilled] , at the time of the visit. [Medical Office: ([facility])___] : at the medical office located in  [Other:____] : [unfilled] [Patient] : the patient [FreeTextEntry1] : undergoing workup for gait imbalance and left leg numbness. Review of MRI brain with CSF flow and MRI lumbar spine

## 2025-02-05 NOTE — REASON FOR VISIT
[Follow-Up: _____] : a [unfilled] follow-up visit [Home] : at home, [unfilled] , at the time of the visit. [Medical Office: ([facility])___] : at the medical office located in  [Other:____] : [unfilled] [Patient] : the patient [FreeTextEntry1] : undergoing workup for gait imbalance and left leg numbness. Review of MRI brain with CSF flow and MRI lumbar spine

## 2025-03-13 NOTE — DATA REVIEWED
[de-identified] : ACC: [id number] EXAM: MR BRAIN W CSF FLOW ORDERED BY: CLYDE HARRISEDY  PROCEDURE DATE: 01/05/2025    INTERPRETATION: CLINICAL INDICATION: NPH workup. Evaluate for CSF obstruction  TECHNIQUE: Multi-planar multi-sequential MR imaging of the brain was performed without intravenous contrast. CSF flow study using 2-D velocity encoded sequences were also obtained.  COMPARISON: MRI brain 11/20/2024  FINDINGS:  There is mild diffuse prominence of the ventricular system mild disproportionate enlargement of the sylvian fissures and crowding at the cerebral convexity. Ventricles are stable in size and morphology since the prior MRI.  The CSF flow study demonstrates flow within the foramen of Monro, cerebral aqueduct and and at the cervicomedullary junction. There is no evidence of CSF obstruction.  No acute infarction, intracranial hemorrhage or mass.  There are no extra-axial fluid collections. The dominant arterial skull base flow voids are present.  The visualized intraorbital contents are normal. Scattered inflammatory changes in the anterior ethmoid air cells. Remainder of the paranasal sinuses are clear. The mastoid air cells are clear. The visualized soft tissues and osseous structures appear normal.   IMPRESSION:  Again demonstrated is mild prominence of the ventricles which is stable from 11/20/2024. There is disproportionate enlargement of the sylvian fissures and crowding the cerebral convexity which can be seen in normal pressure hydrocephalus. However other etiologies would include asymmetric volume loss and clinical correlation is recommended.  No CSF flow obstruction.  --- End of Report ---

## 2025-03-13 NOTE — DATA REVIEWED
[de-identified] : ACC: [id number] EXAM: MR BRAIN W CSF FLOW ORDERED BY: CLYDE HARRISEDY  PROCEDURE DATE: 01/05/2025    INTERPRETATION: CLINICAL INDICATION: NPH workup. Evaluate for CSF obstruction  TECHNIQUE: Multi-planar multi-sequential MR imaging of the brain was performed without intravenous contrast. CSF flow study using 2-D velocity encoded sequences were also obtained.  COMPARISON: MRI brain 11/20/2024  FINDINGS:  There is mild diffuse prominence of the ventricular system mild disproportionate enlargement of the sylvian fissures and crowding at the cerebral convexity. Ventricles are stable in size and morphology since the prior MRI.  The CSF flow study demonstrates flow within the foramen of Monro, cerebral aqueduct and and at the cervicomedullary junction. There is no evidence of CSF obstruction.  No acute infarction, intracranial hemorrhage or mass.  There are no extra-axial fluid collections. The dominant arterial skull base flow voids are present.  The visualized intraorbital contents are normal. Scattered inflammatory changes in the anterior ethmoid air cells. Remainder of the paranasal sinuses are clear. The mastoid air cells are clear. The visualized soft tissues and osseous structures appear normal.   IMPRESSION:  Again demonstrated is mild prominence of the ventricles which is stable from 11/20/2024. There is disproportionate enlargement of the sylvian fissures and crowding the cerebral convexity which can be seen in normal pressure hydrocephalus. However other etiologies would include asymmetric volume loss and clinical correlation is recommended.  No CSF flow obstruction.  --- End of Report ---

## 2025-03-13 NOTE — ASSESSMENT
[FreeTextEntry1] : This 75-year-old male with a history of hypertension, hyperlipidemia, diabetes, kidney disease, and anemia presented with gait difficulties, low back pain, left thigh numbness, and shoulder pain. He also reports cognitive issues. He has a history of arthritis in his lower back and has been worked up for normal pressure hydrocephalus (NPH) in the past.  A recent lumbar puncture reportedly improved his symptoms, per his physical therapist.  We discussed shunt placement for NPH with the patient and his family.  If successful, spine surgery may be considered in the future for lumbar stenosis.  The patient and family will follow up when they are ready to discuss further.   After detailed imaging review and patient examination, Dr. D'Amico discussed surgical intervention of VPS with patient and his daughter. Potential surgical risks vs benefits and alternatives discussed with time allotted for questions and answers given. Patient/daughter verbalize understanding and wish to think about shunt at this time.   PLAN:  - Will notify if would like to proceed with surgical intervention. Contact information provided  Today, my ACP, Lauren Arceo, was here to observe my evaluation and management services for this new problem/exacerbated condition to be followed going forward.      A total of 25 minutes was spent reviewing the labs, imaging and physical examination of the patient. We discussed the diagnosis, and the plan. The patient's questions were answered. The patient demonstrated an excellent understanding of the plan. 
[FreeTextEntry1] : This 75-year-old male with a history of hypertension, hyperlipidemia, diabetes, kidney disease, and anemia presented with gait difficulties, low back pain, left thigh numbness, and shoulder pain. He also reports cognitive issues. He has a history of arthritis in his lower back and has been worked up for normal pressure hydrocephalus (NPH) in the past.  A recent lumbar puncture reportedly improved his symptoms, per his physical therapist.  We discussed shunt placement for NPH with the patient and his family.  If successful, spine surgery may be considered in the future for lumbar stenosis.  The patient and family will follow up when they are ready to discuss further.   After detailed imaging review and patient examination, Dr. D'Amico discussed surgical intervention of VPS with patient and his daughter. Potential surgical risks vs benefits and alternatives discussed with time allotted for questions and answers given. Patient/daughter verbalize understanding and wish to think about shunt at this time.   PLAN:  - Will notify if would like to proceed with surgical intervention. Contact information provided  Today, my ACP, Lauren Arceo, was here to observe my evaluation and management services for this new problem/exacerbated condition to be followed going forward.      A total of 25 minutes was spent reviewing the labs, imaging and physical examination of the patient. We discussed the diagnosis, and the plan. The patient's questions were answered. The patient demonstrated an excellent understanding of the plan. 
Alert-The patient is alert, awake and responds to voice. The patient is oriented to time, place, and person. The triage nurse is able to obtain subjective information.

## 2025-03-13 NOTE — REASON FOR VISIT
[Follow-Up: _____] : a [unfilled] follow-up visit [Family Member] : family member [Home] : at home, [unfilled] , at the time of the visit. [Medical Office: ([facility])___] : at the medical office located in  [Telephone (audio)] : This telephonic visit was provided via audio only technology. [Patient preference] : patient preference. [Verbal consent obtained from patient] : the patient, [unfilled] [FreeTextEntry3] : Patient's daughter Monica [FreeTextEntry1] : s/p high volume lumbar puncure

## 2025-03-13 NOTE — HISTORY OF PRESENT ILLNESS
[FreeTextEntry1] : 76 y/o To[place] speaking Male with PMHx of prostate CA 2023 s/p radiation, elevated BP w/o hx HTN, HLD, DM, CKD stage 2, anemia, who was recently admitted at [facility] Nov 2024 for feelings of dizziness/ off balance while walking, referred by neurology for NPH workup.  Ad part of hospital workup: - CTH negative for acute path - CTA with deposition of calcified plaque with mild to moderate stenosis in association with the cavernous and supraclinoid right internal carotid artery and mild stenosis involving the cavernous and supraclinoid left internal carotid artery. Not a candidate for acute intervention. - MRI negative for acute infarct; with prominence of the temporal horns; findings may be due to volume loss though early hydrocephalus cannot be excluded.  He saw neurology outpatient for follow- up. Was referred for ENT, vestibular therapy for dizziness, referred to neurosurgery due to MRI findings for NPH workup. He saw ENT 12/9 and referred for vestibular therapy.  12/11/24 pt presented for initial evaluation. Endorsed trouble walking described as "wobbliness" for about one month, not worse with turning. Denied urinary incontinence or memory issues. Denied neck pain, weakness, but did note some numbness to the front of his left thigh. Ordered for MRI lumbar spine and MRI brain with CSF flow for further workup.  1/5/25 MRI lumbar spine: - Congenital spinal canal stenosis with superimposed degenerative changes most prominent at L3-4 and L4-5. - L3-4 disc bulge and facet arthrosis contribute to severe spinal canal stenosis and severe right and moderate to severe left neuroforaminal stenosis. - L4-5 severe spinal canal and severe right and moderate to severe left neuroforaminal stenosis.  1/5/25 MRI Brain with CSF flow: - Again demonstrated is mild prominence of the ventricles which is stable from 11/20/2024. There is disproportionate enlargement of the sylvian fissures and crowding the cerebral convexity which can be seen in normal pressure hydrocephalus. However other etiologies would include asymmetric volume loss and clinical correlation is recommended. - No CSF flow obstruction.  2/5/25 patient returned for follow- up. Plan made for high volume lumbar puncture.   3/7/25 s/p high volume lumbar puncture. 30cc drained. Opening pressure 12.  Per PT note: Overall summary- Significantly improved quality of gait observed during post-LP gait assessment, corroborated by slightly improved average TUG test time and slightly improved 3-meter walk test  Returns TODAY for follow- up post LP.  Daughter present for today's visit who assist in ROS/HPI.  She endorses her father's gait was improved the day after LP.    Neurology: KYE Cardoza Endo: Eliza Awan ENT: Nino Dickey

## 2025-03-13 NOTE — REASON FOR VISIT
[Follow-Up: _____] : a [unfilled] follow-up visit [Family Member] : family member [Home] : at home, [unfilled] , at the time of the visit. [Medical Office: ([facility])___] : at the medical office located in  [Telephone (audio)] : This telephonic visit was provided via audio only technology. [Patient preference] : patient preference. [Verbal consent obtained from patient] : the patient, [unfilled] [FreeTextEntry3] : Patient's daughter Monica [FreeTextEntry1] : s/p high volume lumbar puncure

## 2025-03-13 NOTE — HISTORY OF PRESENT ILLNESS
[FreeTextEntry1] : 76 y/o Toisan speaking Male with PMHx of prostate CA 2023 s/p radiation, elevated BP w/o hx HTN, HLD, DM, CKD stage 2, anemia, who was recently admitted at [facility] Nov 2024 for feelings of dizziness/ off balance while walking, referred by neurology for NPH workup.  Ad part of hospital workup: - CTH negative for acute path - CTA with deposition of calcified plaque with mild to moderate stenosis in association with the cavernous and supraclinoid right internal carotid artery and mild stenosis involving the cavernous and supraclinoid left internal carotid artery. Not a candidate for acute intervention. - MRI negative for acute infarct; with prominence of the temporal horns; findings may be due to volume loss though early hydrocephalus cannot be excluded.  He saw neurology outpatient for follow- up. Was referred for ENT, vestibular therapy for dizziness, referred to neurosurgery due to MRI findings for NPH workup. He saw ENT 12/9 and referred for vestibular therapy.  12/11/24 pt presented for initial evaluation. Endorsed trouble walking described as "wobbliness" for about one month, not worse with turning. Denied urinary incontinence or memory issues. Denied neck pain, weakness, but did note some numbness to the front of his left thigh. Ordered for MRI lumbar spine and MRI brain with CSF flow for further workup.  1/5/25 MRI lumbar spine: - Congenital spinal canal stenosis with superimposed degenerative changes most prominent at L3-4 and L4-5. - L3-4 disc bulge and facet arthrosis contribute to severe spinal canal stenosis and severe right and moderate to severe left neuroforaminal stenosis. - L4-5 severe spinal canal and severe right and moderate to severe left neuroforaminal stenosis.  1/5/25 MRI Brain with CSF flow: - Again demonstrated is mild prominence of the ventricles which is stable from 11/20/2024. There is disproportionate enlargement of the sylvian fissures and crowding the cerebral convexity which can be seen in normal pressure hydrocephalus. However other etiologies would include asymmetric volume loss and clinical correlation is recommended. - No CSF flow obstruction.  2/5/25 patient returned for follow- up. Plan made for high volume lumbar puncture.   3/7/25 s/p high volume lumbar puncture. 30cc drained. Opening pressure 12.  Per PT note: Overall summary- Significantly improved quality of gait observed during post-LP gait assessment, corroborated by slightly improved average TUG test time and slightly improved 3-meter walk test  Returns TODAY for follow- up post LP.  Daughter present for today's visit who assist in ROS/HPI.  She endorses her father's gait was improved the day after LP.    Neurology: KYE Cardoza Endo: Eliza Awan ENT: Nino Dickey

## 2025-03-14 NOTE — HISTORY OF PRESENT ILLNESS
[FreeTextEntry1] : Daryl Parra is a 75-year-old Male Cantonese speaking with PMHx of prostate CA 2023 s/p radiation, elevated BP w/o hx HTN, HLD, DM, CKD stage 2, anemia, vertigo and recently NPH (ff Dr. D'Amico) and mild to moderate carotid stenosis who presents to the office for follow-up.  Since last visit, patient vertigo has subsided. Daughter reports he denies further dizziness and/or headaches. He did not do vestibular therapy and symptoms went away on its own. She recently followed up with Dr. D'Amico, he had a LP last week which some improvement with his gait. He ambulates without a cane. He is recommended a  shunt. per daughter patient is hesitant and is deferring at this time. They are worried about infection and also, he might need further spinal surgery. Patient and daughter will reach out to Dr. D'Amico when he is ready. Per daughter he is complaining of new onset right shoulder pain x 2 months, denies trauma. No hx of falls. Patient pain in joint area.  Daughter reports he is compliant with his ASA and Atorvastatin. She reports he had reduced his food intake; now is monitoring is sugar more closely with a monitor.

## 2025-03-14 NOTE — PHYSICAL EXAM
[FreeTextEntry1] : Alert. Fully oriented. Speech and language are intact. Cranial nerves II-XII are intact. No nystagmus Motor exam reveals intact strength with individual muscle testing in bilateral upper and lower extremities. Full ROM of shoulder joints in R side, slightly tender to touch. Tone is normal. Reflexes are normal. Sensation is intact to light touch in distal extremities. Finger-to-nose and heel-to-shin are intact. Rapid alternating movements are normal in the upper and lower extremities. Broad based gait, some improvement from last visit.

## 2025-03-14 NOTE — ASSESSMENT
[FreeTextEntry1] : Daryl Parra is a 75-year-old Male Cantonese speaking with PMHx of prostate CA 2023 s/p radiation, elevated BP w/o hx HTN, HLD, DM, CKD stage 2, anemia, vertigo and recently NPH (ff Dr. D'Amico) and mild to moderate carotid stenosis who presents to the office for follow-up.  Vertigo got better without vestibular treatment. NPH treatment per Dr. D'Amico. Advised to keep taking ASA and statin for ICAD, LDL at goal.   Plan: -Advised to continue ASA and Lipitor for ICAD -Repeat MRA H/N in 1 year to assess stenosis -Continue to f/u with Endocrinology for DM II management (A1c 11%) -Continue to follow-up with neurosurgery for NPH, possible shunt, although patient refusing it at this time -RTC in 6 months with Dr. Nelson

## 2025-04-07 NOTE — END OF VISIT
[FreeTextEntry3] :  All medical record entries made by the Scribe were at my, Dr. Thomas Kinsey, direction and personally dictated by me on 04/07/2025. I have reviewed the chart and agree that the record accurately reflects my personal performance of the history, physical exam, and assessment and plan. I have also personally directed, reviewed, and agreed with the chart.

## 2025-04-07 NOTE — REVIEW OF SYSTEMS
[Negative] : Heme/Lymph [Incontinence] : incontinence [FreeTextEntry8] : infrequent, has improved since last visit.

## 2025-04-07 NOTE — HISTORY OF PRESENT ILLNESS
[FreeTextEntry1] : 74 year old M pt, with Hx of T2DM (dx. > 20 years), presents today to establish endocrine care. Other PMHx: HTN, HLD, CKD2, anemia, prostate CA dx 05/2023 s/p radiation No PMHx of: retinopathy, peripheral neuropathy, CAD PSHx: None FHx: Child with DM SHx: Former smoker of 30 years, no EtOH NKDA Pt's Daughter: Monica Parra, PHONE [phone]  12/13/2024 CC: "I am here for a refill of my insulin." Pt is a Cantonese speaker who presents today accompanied by his son. Pt is currently taking Lantus 22 u qpm, and Lispro 8 u ac (prescribed 9/7/7) after discharge from the hospital on 11/21/24. He reports FBS ~120s.  Pt has been on insulin for ~1 month. He was recently hospitalized for 3 days 11/19/24 due to LE edema, leg weakness, and dizziness. He was prescribed dizziness medication with benefit and follows-up with his PCP every 3 months. Pt endorses nocturia 2x, weight gain (~ 1 month), and blurred vision.   - 12/13/2024 A1c 9.4%. - 11/19/24 A1c 11.1%, LDL-c 48, TSH 0.507  04/07/2025 Pt has POCT 121, /67 and BMI 24.6. He gained 15 lbs in 5 months. 02/11/2025 s-creat. 0.64, A1c 8.3% 04/07/2025 A1c 7.9% Today, the pt is accompanied by his daughter (his daughter states that through daily conversations, she believes that he has been feeling better). After a hospital visit in 11/2024, the pt's family saw a neurologist who dx him with fluid in his brain. The neurologist decided not to insert the shunt for the time being and decided to obtain a spinal tap. The dx was fluid in the brain and lower back herniation. Pt daughter endorses infrequent incontinence (this has improved since his last visit). FBS is within 200-250 (his typical breakfast is oatmeal and coffee, but in the last week he switched to coffee and his FBS went down). Pt takes Lantus 20 u in the morning and uses Lispro + ss.    [Medications verified as per pt on 04/07/2025] Current Medications: Lantus 22 u qpm, Lispro 9/7/7 u ac, Meclizine 12.5 mg PRN, Atorvastatin 10 mg qd, ASA 81 mg, ferrous sulfate 325 mg, Flomax 0.4 mg qd, Prilosec

## 2025-04-07 NOTE — ADDENDUM
[FreeTextEntry1] :  I, Anusha Lawrence, act solely as a scribe for Dr. Thomas Kinsey on this date. 04/07/2025
[FreeTextEntry1] :  I, Anusha Lawrence, act solely as a scribe for Dr. Tohmas Kinsey on this date. 04/07/2025
[Maintenance] : Maintenance: The patient has quit for more than 6 months
DISPLAY PLAN FREE TEXT

## 2025-04-07 NOTE — HISTORY OF PRESENT ILLNESS
[FreeTextEntry1] : 74 year old M pt, with Hx of T2DM (dx. > 20 years), presents today to establish endocrine care. Other PMHx: HTN, HLD, CKD2, anemia, prostate CA dx 05/2023 s/p radiation No PMHx of: retinopathy, peripheral neuropathy, CAD PSHx: None FHx: Child with DM SHx: Former smoker of 30 years, no EtOH NKDA Pt's Daughter: Monica Parra, PHONE [phone]  12/13/2024 CC: "I am here for a refill of my insulin." Pt is a Cantonese speaker who presents today accompanied by his son. Pt is currently taking Lantus 22 u qpm, and Lispro 8 u ac (prescribed 9/7/7) after discharge from the hospital on 11/21/24. He reports FBS ~120s.  Pt has been on insulin for ~1 month. He was recently hospitalized for 3 days 11/19/24 due to LE edema, leg weakness, and dizziness. He was prescribed dizziness medication with benefit and follows-up with his PCP every 3 months. Pt endorses nocturia 2x, weight gain (~ 1 month), and blurred vision.   - 12/13/2024 A1c 9.4%. - 11/19/24 A1c 11.1%, LDL-c 48, TSH 0.507  04/07/2025 Pt has POCT 121, /67 and BMI 24.6. He gained 15 lbs in 5 months. 02/11/2025 s-creat. 0.64, A1c 8.3% 04/07/2025 A1c 7.9% Today, the pt is accompanied by his daughter (his daughter states that through daily conversations, she believes that he has been feeling better). After a hospital visit in 11/2024, the pt's family saw a neurologist who dx him with fluid in his brain. The neurologist decided not to insert the shunt for the time being and decided to obtain a spinal tap. The dx was fluid in the brain and lower back herniation. Pt daughter endorses infrequent incontinence (this has improved since his last visit). FBS is within 200-250 (his typical breakfast is oatmeal and coffee, but in the last week he switched to coffee and his FBS went down). Pt takes Lantus 20 u in the morning and uses Lispro + ss.    [Medications verified as per pt on 04/07/2025] Current Medications: Lantus 22 u qpm, Lispro 9/7/7 u ac, Meclizine 12.5 mg PRN, Atorvastatin 10 mg qd, ASA 81 mg, ferrous sulfate 325 mg, Flomax 0.4 mg qd, Prilosec

## 2025-07-16 NOTE — PHYSICAL EXAM
[FreeTextEntry1] : Alert. Fully oriented. Speech and language are intact. Cranial nerves II-XII are intact. No nystagmus Motor exam reveals intact strength with individual muscle testing in bilateral upper and lower extremities. Tone is normal. Reflexes are normal. Sensation is intact to light touch in distal extremities. Finger-to-nose and heel-to-shin are intact. Rapid alternating movements are normal in the upper and lower extremities. Broad based gait, improved from last visit.

## 2025-07-16 NOTE — ASSESSMENT
[FreeTextEntry1] : Daryl Parra is a 75-year-old Male Cantonese speaking with PMHx of prostate CA 2023 s/p radiation, elevated BP w/o hx HTN, HLD, DM, CKD stage 2, anemia, vertigo and recently NPH (ff Dr. D'Amico), ICAD and mild to moderate carotid stenosis who presents to the office for follow-up.  Vertigo got better without vestibular treatment. NPH treatment per Dr. D'Amico. Advised to keep taking ASA and statin for ICAD, LDL at goal.   Plan: -Advised to continue ASA and Lipitor for ICAD -Repeat Carotid Doppler approximately January 2026 -Continue aggressive risk factor- exercise and diet management -Repeat MRA H/N in 1 year to assess stenosis (approx January 2026) -Continue to f/u with Endocrinology for DM II management (A1c 7.5%) -Continue to follow-up with neurosurgery for NPH, possible shunt, although patient refusing it at this time -RTC in 6 months with Dr. Nelson

## 2025-07-16 NOTE — HISTORY OF PRESENT ILLNESS
[FreeTextEntry1] : Daryl Parra is a 75-year-old Male Cantonese speaking with PMHx of prostate CA 2023 s/p radiation, elevated BP w/o hx HTN, HLD, DM, CKD stage 2, anemia, vertigo and recently NPH (ff Dr. D'Amico), ICAD and mild to moderate carotid stenosis who presents to the office for follow-up.  Since last visit, patient remains stable. He denies recurrence of vertigo, no stroke-like symptoms. He saw rheumatologist yesterday due to joint pains and swelling specially in his left fingers/hand. Daughter reports one episode of dizziness that lasted 1 day, recommended increased hydration. She reports he has been exercising daily, going for walks, and eating healthier. Daughter reports walking is better since lumbar puncture but has twisted his leg 2x because he did not use his cane otherwise no falls.   Patient is still refusing shunt for hydrocephalus as he feels stable at this time.   Recent labs a1c 7.5%, LDL 32